# Patient Record
Sex: MALE | Race: BLACK OR AFRICAN AMERICAN | NOT HISPANIC OR LATINO | Employment: OTHER | URBAN - METROPOLITAN AREA
[De-identification: names, ages, dates, MRNs, and addresses within clinical notes are randomized per-mention and may not be internally consistent; named-entity substitution may affect disease eponyms.]

---

## 2017-03-20 ENCOUNTER — APPOINTMENT (OUTPATIENT)
Dept: RADIOLOGY | Facility: MEDICAL CENTER | Age: 67
End: 2017-03-20
Attending: EMERGENCY MEDICINE
Payer: MEDICARE

## 2017-03-20 ENCOUNTER — HOSPITAL ENCOUNTER (OUTPATIENT)
Facility: MEDICAL CENTER | Age: 67
End: 2017-03-24
Attending: EMERGENCY MEDICINE | Admitting: HOSPITALIST
Payer: MEDICARE

## 2017-03-20 ENCOUNTER — RESOLUTE PROFESSIONAL BILLING HOSPITAL PROF FEE (OUTPATIENT)
Dept: HOSPITALIST | Facility: MEDICAL CENTER | Age: 67
End: 2017-03-20
Payer: MEDICARE

## 2017-03-20 DIAGNOSIS — R00.2 PALPITATIONS: ICD-10-CM

## 2017-03-20 DIAGNOSIS — R07.81 PLEURITIC CHEST PAIN: ICD-10-CM

## 2017-03-20 PROBLEM — R06.09 DOE (DYSPNEA ON EXERTION): Status: ACTIVE | Noted: 2017-03-20

## 2017-03-20 PROBLEM — R55 NEAR SYNCOPE: Status: ACTIVE | Noted: 2017-03-20

## 2017-03-20 PROBLEM — N17.9 AKI (ACUTE KIDNEY INJURY) (HCC): Status: ACTIVE | Noted: 2017-03-20

## 2017-03-20 PROBLEM — R14.0 ABDOMINAL DISTENSION (GASEOUS): Status: ACTIVE | Noted: 2017-03-20

## 2017-03-20 LAB
ALBUMIN SERPL BCP-MCNC: 3.9 G/DL (ref 3.2–4.9)
ALBUMIN/GLOB SERPL: 1.3 G/DL
ALP SERPL-CCNC: 51 U/L (ref 30–99)
ALT SERPL-CCNC: 11 U/L (ref 2–50)
ANION GAP SERPL CALC-SCNC: 6 MMOL/L (ref 0–11.9)
APTT PPP: 26.2 SEC (ref 24.7–36)
AST SERPL-CCNC: 18 U/L (ref 12–45)
BASOPHILS # BLD AUTO: 0.7 % (ref 0–1.8)
BASOPHILS # BLD: 0.03 K/UL (ref 0–0.12)
BILIRUB SERPL-MCNC: 0.4 MG/DL (ref 0.1–1.5)
BNP SERPL-MCNC: 95 PG/ML (ref 0–100)
BUN SERPL-MCNC: 15 MG/DL (ref 8–22)
CALCIUM SERPL-MCNC: 9.2 MG/DL (ref 8.5–10.5)
CHLORIDE SERPL-SCNC: 105 MMOL/L (ref 96–112)
CO2 SERPL-SCNC: 25 MMOL/L (ref 20–33)
CORTIS SERPL-MCNC: 8.7 UG/DL (ref 0–23)
CREAT SERPL-MCNC: 1.58 MG/DL (ref 0.5–1.4)
EKG IMPRESSION: NORMAL
EOSINOPHIL # BLD AUTO: 0.2 K/UL (ref 0–0.51)
EOSINOPHIL NFR BLD: 4.4 % (ref 0–6.9)
ERYTHROCYTE [DISTWIDTH] IN BLOOD BY AUTOMATED COUNT: 53.1 FL (ref 35.9–50)
GFR SERPL CREATININE-BSD FRML MDRD: 44 ML/MIN/1.73 M 2
GLOBULIN SER CALC-MCNC: 3 G/DL (ref 1.9–3.5)
GLUCOSE SERPL-MCNC: 94 MG/DL (ref 65–99)
HCT VFR BLD AUTO: 36.3 % (ref 42–52)
HGB BLD-MCNC: 11.5 G/DL (ref 14–18)
IMM GRANULOCYTES # BLD AUTO: 0.03 K/UL (ref 0–0.11)
IMM GRANULOCYTES NFR BLD AUTO: 0.7 % (ref 0–0.9)
INR PPP: 0.95 (ref 0.87–1.13)
LIPASE SERPL-CCNC: 59 U/L (ref 11–82)
LYMPHOCYTES # BLD AUTO: 1.47 K/UL (ref 1–4.8)
LYMPHOCYTES NFR BLD: 32.1 % (ref 22–41)
MCH RBC QN AUTO: 26.9 PG (ref 27–33)
MCHC RBC AUTO-ENTMCNC: 31.7 G/DL (ref 33.7–35.3)
MCV RBC AUTO: 84.8 FL (ref 81.4–97.8)
MONOCYTES # BLD AUTO: 1.18 K/UL (ref 0–0.85)
MONOCYTES NFR BLD AUTO: 25.8 % (ref 0–13.4)
NEUTROPHILS # BLD AUTO: 1.67 K/UL (ref 1.82–7.42)
NEUTROPHILS NFR BLD: 36.3 % (ref 44–72)
NRBC # BLD AUTO: 0 K/UL
NRBC BLD AUTO-RTO: 0 /100 WBC
PLATELET # BLD AUTO: 216 K/UL (ref 164–446)
PMV BLD AUTO: 10.2 FL (ref 9–12.9)
POTASSIUM SERPL-SCNC: 4.1 MMOL/L (ref 3.6–5.5)
PROT SERPL-MCNC: 6.9 G/DL (ref 6–8.2)
PROTHROMBIN TIME: 13 SEC (ref 12–14.6)
RBC # BLD AUTO: 4.28 M/UL (ref 4.7–6.1)
SODIUM SERPL-SCNC: 136 MMOL/L (ref 135–145)
TROPONIN I SERPL-MCNC: 0.02 NG/ML (ref 0–0.04)
TROPONIN I SERPL-MCNC: 0.03 NG/ML (ref 0–0.04)
WBC # BLD AUTO: 4.6 K/UL (ref 4.8–10.8)

## 2017-03-20 PROCEDURE — 80053 COMPREHEN METABOLIC PANEL: CPT

## 2017-03-20 PROCEDURE — A9270 NON-COVERED ITEM OR SERVICE: HCPCS | Performed by: HOSPITALIST

## 2017-03-20 PROCEDURE — 93306 TTE W/DOPPLER COMPLETE: CPT

## 2017-03-20 PROCEDURE — 85730 THROMBOPLASTIN TIME PARTIAL: CPT

## 2017-03-20 PROCEDURE — 700105 HCHG RX REV CODE 258: Performed by: HOSPITALIST

## 2017-03-20 PROCEDURE — 83880 ASSAY OF NATRIURETIC PEPTIDE: CPT

## 2017-03-20 PROCEDURE — 71010 DX-CHEST-PORTABLE (1 VIEW): CPT

## 2017-03-20 PROCEDURE — 36415 COLL VENOUS BLD VENIPUNCTURE: CPT

## 2017-03-20 PROCEDURE — 83690 ASSAY OF LIPASE: CPT

## 2017-03-20 PROCEDURE — 99285 EMERGENCY DEPT VISIT HI MDM: CPT

## 2017-03-20 PROCEDURE — 93005 ELECTROCARDIOGRAM TRACING: CPT | Performed by: EMERGENCY MEDICINE

## 2017-03-20 PROCEDURE — 99220 PR INITIAL OBSERVATION CARE,LEVL III: CPT | Performed by: HOSPITALIST

## 2017-03-20 PROCEDURE — 700102 HCHG RX REV CODE 250 W/ 637 OVERRIDE(OP): Performed by: HOSPITALIST

## 2017-03-20 PROCEDURE — 96360 HYDRATION IV INFUSION INIT: CPT

## 2017-03-20 PROCEDURE — 82533 TOTAL CORTISOL: CPT

## 2017-03-20 PROCEDURE — 96361 HYDRATE IV INFUSION ADD-ON: CPT

## 2017-03-20 PROCEDURE — 84484 ASSAY OF TROPONIN QUANT: CPT

## 2017-03-20 PROCEDURE — 93306 TTE W/DOPPLER COMPLETE: CPT | Mod: 26 | Performed by: INTERNAL MEDICINE

## 2017-03-20 PROCEDURE — 700111 HCHG RX REV CODE 636 W/ 250 OVERRIDE (IP): Performed by: HOSPITALIST

## 2017-03-20 PROCEDURE — 85025 COMPLETE CBC W/AUTO DIFF WBC: CPT

## 2017-03-20 PROCEDURE — G0378 HOSPITAL OBSERVATION PER HR: HCPCS

## 2017-03-20 PROCEDURE — 85610 PROTHROMBIN TIME: CPT

## 2017-03-20 RX ORDER — POLYETHYLENE GLYCOL 3350 17 G/17G
1 POWDER, FOR SOLUTION ORAL DAILY
Status: DISCONTINUED | OUTPATIENT
Start: 2017-03-20 | End: 2017-03-24 | Stop reason: HOSPADM

## 2017-03-20 RX ORDER — ONDANSETRON 4 MG/1
4 TABLET, ORALLY DISINTEGRATING ORAL EVERY 4 HOURS PRN
Status: DISCONTINUED | OUTPATIENT
Start: 2017-03-20 | End: 2017-03-24 | Stop reason: HOSPADM

## 2017-03-20 RX ORDER — SODIUM CHLORIDE 9 MG/ML
INJECTION, SOLUTION INTRAVENOUS CONTINUOUS
Status: DISCONTINUED | OUTPATIENT
Start: 2017-03-20 | End: 2017-03-21

## 2017-03-20 RX ORDER — AMOXICILLIN 250 MG
2 CAPSULE ORAL 2 TIMES DAILY
Status: DISCONTINUED | OUTPATIENT
Start: 2017-03-20 | End: 2017-03-24 | Stop reason: HOSPADM

## 2017-03-20 RX ORDER — ONDANSETRON 2 MG/ML
4 INJECTION INTRAMUSCULAR; INTRAVENOUS EVERY 4 HOURS PRN
Status: DISCONTINUED | OUTPATIENT
Start: 2017-03-20 | End: 2017-03-24 | Stop reason: HOSPADM

## 2017-03-20 RX ORDER — HEPARIN SODIUM 5000 [USP'U]/ML
5000 INJECTION, SOLUTION INTRAVENOUS; SUBCUTANEOUS EVERY 8 HOURS
Status: DISCONTINUED | OUTPATIENT
Start: 2017-03-20 | End: 2017-03-24 | Stop reason: HOSPADM

## 2017-03-20 RX ORDER — POLYETHYLENE GLYCOL 3350 17 G/17G
1 POWDER, FOR SOLUTION ORAL
Status: DISCONTINUED | OUTPATIENT
Start: 2017-03-20 | End: 2017-03-24 | Stop reason: HOSPADM

## 2017-03-20 RX ORDER — BISACODYL 10 MG
10 SUPPOSITORY, RECTAL RECTAL
Status: DISCONTINUED | OUTPATIENT
Start: 2017-03-20 | End: 2017-03-24 | Stop reason: HOSPADM

## 2017-03-20 RX ADMIN — STANDARDIZED SENNA CONCENTRATE AND DOCUSATE SODIUM 2 TABLET: 8.6; 5 TABLET, FILM COATED ORAL at 20:56

## 2017-03-20 RX ADMIN — HEPARIN SODIUM 5000 UNITS: 5000 INJECTION, SOLUTION INTRAVENOUS; SUBCUTANEOUS at 20:56

## 2017-03-20 RX ADMIN — SODIUM CHLORIDE: 9 INJECTION, SOLUTION INTRAVENOUS at 16:40

## 2017-03-20 ASSESSMENT — LIFESTYLE VARIABLES: DO YOU DRINK ALCOHOL: NO

## 2017-03-20 ASSESSMENT — PAIN SCALES - GENERAL
PAINLEVEL_OUTOF10: 0
PAINLEVEL_OUTOF10: 0

## 2017-03-20 NOTE — ED PROVIDER NOTES
ED Provider Note    CHIEF COMPLAINT  Chief Complaint   Patient presents with   • Chest Pain     Chest tightness and SOB with sexual activity and walking since Jan. Had a severe episode today while walking to [facility] [employer] and went to [facility] and was found to have a L BBB and ST elevation in anterior leads.    • Shortness of Breath     SOB with chest tightness.        HPI  Seth Olivera is a 66 y.o. male who presents with episode of heart racing, around 8 AM this morning, lasted several seconds, less than 30 seconds. When this episode happened he did have some fullness in his chest and shortness of breath but after he took a few deep breaths the palpitations resolved. No chest pain no nausea no vomiting no diaphoresis no radiation. This episode today lasted somewhat less than 30 seconds, has had similar episodes multiple times in the last 3 months. No workup.  Evidently he was seen by paramedics and given him aspirin  REVIEW OF SYSTEMS  See HPI for further details. Denies other G.I., G.U.. endrocine, cardiovascular, respriatory or neurological problems. All other systems are negative.     PAST MEDICAL HISTORY  Past Medical History   Diagnosis Date   • High cholesterol        FAMILY HISTORY negative for heart disease  History reviewed. No pertinent family history.    SOCIAL HISTORY he tells me he is a nonsmoker  Social History     Social History   • Marital Status: Single     Spouse Name: N/A   • Number of Children: N/A   • Years of Education: N/A     Social History Main Topics   • Smoking status: Current Some Day Smoker     Types: Cigarettes   • Smokeless tobacco: Never Used   • Alcohol Use: Yes      Comment: heavy 3 days per month   • Drug Use: No   • Sexual Activity: Not Asked     Other Topics Concern   • None     Social History Narrative       SURGICAL HISTORY  Past Surgical History   Procedure Laterality Date   • Other orthopedic surgery       hardware in back       CURRENT MEDICATIONS  Home Medications      "Reviewed by Jennifer Harley R.N. (Registered Nurse) on 03/20/17 at 1012  Med List Status: Complete    Medication Last Dose Status          Patient Jonh Taking any Medications                        ALLERGIES  No Known Allergies    PHYSICAL EXAM  VITAL SIGNS: /98 mmHg  Pulse 85  Temp(Src) 36.8 °C (98.2 °F)  Resp 28  Ht 1.88 m (6' 2\")  Wt 78.019 kg (172 lb)  BMI 22.07 kg/m2  SpO2 97%  Constitutional: Well developed, Well nourished, No acute distress, Non-toxic appearance.   HENT: Normocephalic, Atraumatic, Bilateral external ears normal, Oropharynx moist, No oral exudates, Nose normal.   Eyes: PERRL, EOMI, Conjunctiva normal, No discharge.   Neck: Normal range of motion, No tenderness, Supple, No stridor.   Lymphatic: No lymphadenopathy noted.   Cardiovascular: Normal heart rate, Normal rhythm, No murmurs, No rubs, No gallops.   Thorax & Lungs: Normal breath sounds, No respiratory distress, No wheezing, No chest tenderness.   Abdomen:  No tenderness, no guarding no rigidity and the abdomen is soft.  No masses, No pulsatile masses.  Skin: Warm, Dry, No erythema, No rash.   Back: No tenderness, No CVA tenderness.   Extremities: Intact distal pulses, No edema, No tenderness, No cyanosis, No clubbing.   Musculoskeletal: Good range of motion in all major joints. No tenderness to palpation or major deformities noted.   Neurologic: Alert & oriented x 3, Normal motor function, Normal sensory function, No focal deficits noted.   Psychiatric: Affect normal, Judgment normal, Mood normal.   EKG Interpretation    Interpreted by me    Rhythm: normal sinus   Rate: normal  Axis: normal  Ectopy: none  Conduction: Left bundle branch block     ST Segments: no acute change  T Waves: no acute change  Q Waves: none  Described to you for left ventricular hypertrophy  Clinical Impression: I do not have an old EKG to compare to      RADIOLOGY/PROCEDURES  DX-CHEST-PORTABLE (1 VIEW)   Final Result      No acute cardiopulmonary " disease.            COURSE & MEDICAL DECISION MAKING  Pertinent Labs & Imaging studies reviewed. (See chart for details) BNP is normal troponin and normal electrolytes, renal function creatinine slightly elevated. White count, 4.6 hematocrit 36 platelet count normal    He has had multiple episodes of heart beating fast, over the last 3 months, never last more than a few seconds. He does have some strange feeling in his chest when this happens and slight shortness of breath but it resolves quickly. Has been given aspirin this morning  Think this is more transient arrhythmia and chest pain. We'll talk with cardiology about possible monitoring or other plans.  Dr. Price from cardiology will see this patient shortly    Final impression:     1.   1. Palpitations        2.   3.     Disposition  Final disposition will be per Dr. Price  Electronically signed by: Zackary Dupree, 3/20/2017 10:13 AM

## 2017-03-20 NOTE — IP AVS SNAPSHOT
" <p align=\"LEFT\"><IMG SRC=\"//EMRWB/blob$/Images/[email or web address]\" alt=\"Image\" WIDTH=\"50%\" HEIGHT=\"200\" BORDER=\"\"></p>                   Name:Seth Olivera  Medical Record Number:[id number]  CSN: [id number]    YOB: 1950   Age: 66 y.o.  Sex: male  HT:1.88 m (6' 2\") WT: 76.8 kg (169 lb 5 oz)          Admit Date: 3/20/2017     Discharge Date:   Today's Date: 3/24/2017  Attending Doctor:  Chrissy Prescott M.D.                  Allergies:  Review of patient's allergies indicates no known allergies.          Your appointments     Mar 30, 2017  7:45 AM   Heart Failure New with Al Mcgarry M.D.   [facility] (--)    [address], [address]  [place] [address] [phone]            May 01, 2017  9:20 AM   New Patient with JUAN Zambrano   [facility] [address] ([facility])    [address]  [place] [address] [phone]           Please bring Photo ID, Insurance Cards, All Medication Bottles and copies of any legal documents (such as Living Will, Power of ) If speaking a language besides English please bring an adult . Please arrive 30 minutes prior for check in and registration. You will be receiving a confirmation call a few days before your appointment from our automated call confirmation system.              Follow-up Information     1. Follow up with Al Mcgarry M.D. In 2 weeks.    Specialty:  Cardiology    Contact information    [address]  [address]  [place] [address] [phone]          2. Follow up with JUAN Zambrano In 1 week.    Specialty:  Family Medicine    Contact information    [address] [facility] [address]  [place] [address] [phone]           Medication List      Take these Medications        Instructions    aspirin 81 MG Chew chewable tablet   Commonly known as:  ASA    Take 1 Tab by mouth every day.   Dose:  81 mg       atorvastatin 40 MG Tabs   Commonly known as:  LIPITOR    Take 1 Tab by " mouth every bedtime.   Dose:  40 mg       carvedilol 12.5 MG Tabs   Commonly known as:  COREG    Take 1 Tab by mouth 2 times a day, with meals.   Dose:  12.5 mg       ferrous gluconate 324 (38 FE) MG Tabs   Commonly known as:  FERGON    Take 1 Tab by mouth every morning with breakfast.   Dose:  324 mg       fluticasone 50 MCG/ACT nasal spray   Commonly known as:  FLONASE    Spray 2 Sprays in nose every day.   Dose:  2 Spray       lisinopril 5 MG Tabs   Commonly known as:  PRINIVIL    Take 1 Tab by mouth every day.   Dose:  5 mg       multivitamin Tabs    Take 1 Tab by mouth every day.   Dose:  1 Tab       oseltamivir 30 MG Caps   Commonly known as:  TAMIFLU    Take 1 Cap by mouth every 12 hours for 3 days.   Dose:  30 mg       spironolactone 25 MG Tabs   Commonly known as:  ALDACTONE    Take 0.5 Tabs by mouth every day.   Dose:  12.5 mg       VITAMIN D PO    Take 3 Caps by mouth every 7 days.   Dose:  3 Cap

## 2017-03-20 NOTE — ED NOTES
[facility] with c/o   Chief Complaint   Patient presents with   • Chest Pain     Chest tightness and SOB with sexual activity and walking since Jan. Had a severe episode today while walking [facility] [employer] and went to [facility] and was found to have a L BBB and ST elevation in anterior leads.    • Shortness of Breath     SOB with chest tightness.    Received 324 mg ASA PTA. Denies current CP or tightness. Blood drawn and sent to lab. EKG complete and given to ERP. ERP at bedside.

## 2017-03-20 NOTE — IP AVS SNAPSHOT
" Home Care Instructions                                                                                                                  Name:Seth Olivera  Medical Record Number:[id number]  CSN: [id number]    YOB: 1950   Age: 66 y.o.  Sex: male  HT:1.88 m (6' 2\") WT: 76.8 kg (169 lb 5 oz)          Admit Date: 3/20/2017     Discharge Date:   Today's Date: 3/24/2017  Attending Doctor:  Chrissy Prescott M.D.                  Allergies:  Review of patient's allergies indicates no known allergies.            Discharge Instructions       Cardiac Event Monitoring  A cardiac event monitor is a small recording device used to help detect abnormal heart rhythms (arrhythmias). The monitor is used to record heart rhythm when noticeable symptoms such as the following occur:  · Fast heartbeats (palpitations), such as heart racing or fluttering.  · Dizziness.  · Fainting or light-headedness.  · Unexplained weakness.  The monitor is wired to two electrodes placed on your chest. Electrodes are flat, sticky disks that attach to your skin. The monitor can be worn for up to 30 days. You will wear the monitor at all times, except when bathing.   HOW TO USE YOUR CARDIAC EVENT MONITOR  A technician will prepare your chest for the electrode placement. The technician will show you how to place the electrodes, how to work the monitor, and how to replace the batteries. Take time to practice using the monitor before you leave the office. Make sure you understand how to send the information from the monitor to your health care provider. This requires a telephone with a landline, not a cell phone. You need to:  · Wear your monitor at all times, except when you are in water:  · Do not get the monitor wet.  · Take the monitor off when bathing. Do not swim or use a hot tub with it on.  · Keep your skin clean. Do not put body lotion or moisturizer on your chest.  · Change the electrodes daily or any time they stop sticking to your skin. You " might need to use tape to keep them on.  · It is possible that your skin under the electrodes could become irritated. To keep this from happening, try to put the electrodes in slightly different places on your chest. However, they must remain in the area under your left breast and in the upper right section of your chest.  · Make sure the monitor is safely clipped to your clothing or in a location close to your body that your health care provider recommends.  · Press the button to record when you feel symptoms of heart trouble, such as dizziness, weakness, light-headedness, palpitations, thumping, shortness of breath, unexplained weakness, or a fluttering or racing heart. The monitor is always on and records what happened slightly before you pressed the button, so do not worry about being too late to get good information.  · Keep a diary of your activities, such as walking, doing chores, and taking medicine. It is especially important to note what you were doing when you pushed the button to record your symptoms. This will help your health care provider determine what might be contributing to your symptoms. The information stored in your monitor will be reviewed by your health care provider alongside your diary entries.  · Send the recorded information as recommended by your health care provider. It is important to understand that it will take some time for your health care provider to process the results.  · Change the batteries as recommended by your health care provider.  SEEK IMMEDIATE MEDICAL CARE IF:   · You have chest pain.  · You have extreme difficulty breathing or shortness of breath.  · You develop a very fast heartbeat that persists.  · You develop dizziness that does not go away.  · You faint or constantly feel you are about to faint.     This information is not intended to replace advice given to you by your health care provider. Make sure you discuss any questions you have with your health care  provider.     Document Released: 09/26/2009 Document Revised: 01/08/2016 Document Reviewed: 06/16/2014  [employer] Patient Education ©2016 [employer]    Cardiac Arrhythmia  Your heart is a muscle that works to pump blood through your body by regular contractions. The beating of your heart is controlled by a system of special pacemaker cells. These cells control the electrical activity of the heart. When the system controlling this regular beating is disturbed, a heart rhythm abnormality (arrhythmia) results.  WHEN YOUR HEART SKIPS A BEAT  One of the most common and least serious heart arrhythmias is called an ectopic or premature atrial heartbeat (PAC). This may be noticed as a small change in your regular pulse. A PAC originates from the top part (atrium) of the heart. Within the right atrium, the SA node is the area that normally controls the regularity of the heart. PACs occur in heart tissue outside of the SA node region. You may feel this as a skipped beat or heart flutter, especially if several occur in succession or occur frequently.   Another arrhythmia is ventricular premature complex (VCP or PVC). These extra beats start out in the bottom, more muscular chambers of the heart. In most cases a PVC is harmless. If there are underlying causes that are making the heart irritable such as an overactive thyroid or a prior heart attack PVCs may be of more concern. In a few cases, medications to control the heart rhythm may be prescribed.  Things to try at home:  · Cut down or avoid alcohol, tobacco and caffeine.  · Get enough sleep.  · Reduce stress.  · Exercise more.  WHEN THE HEART BEATS TOO FAST  Atrial tachycardia is a fast heart rate, which starts out in the atrium. It may last from minutes to much longer. Your heart may beat 140 to 240 times per minute instead of the normal 60 to 100.  · Symptoms include a worried feeling (anxiety) and a sense that your heart is beating fast and hard.  · You may  be able to stop the fast rate by holding your breath or bearing down as if you were going to have a bowel movement.  · This type of fast rate is usually not dangerous.  Atrial fibrillation and atrial flutter are other fast rhythms that start in the atria. Both conditions keep the atria from filling with enough blood so the heart does not work well.  · Symptoms include feeling light-headed or faint.  · These fast rates may be the result of heart damage or disease. Too much thyroid hormone may play a role.  · There may be no clear cause or it may be from heart disease or damage.  · Medication or a special electrical treatment (cardioversion) may be needed to get the heart beating normally.  Ventricular tachycardia is a fast heart rate that starts in the lower muscular chambers (ventricles) This is a serious disorder that requires treatment as soon as possible. You need someone else to get and use a small defibrillator.  · Symptoms include collapse, chest pain, or being short of breath.  · Treatment may include medication, procedures to improve blood flow to the heart, or an implantable cardiac defibrillator (ICD).  DIAGNOSIS   · A cardiogram (EKG or ECG) will be done to see the arrhythmia, as well as lab tests to check the underlying cause.  · If the extra beats or fast rate come and go, you may wear a Holter monitor that records your heart rate for a longer period of time.  SEEK MEDICAL CARE IF:  · You have irregular or fast heartbeats (palpitations).  · You experience skipped beats.  · You develop lightheadedness.  · You have chest discomfort.  · You have shortness of breath.  · You have more frequent episodes, if you are already being treated.  SEEK IMMEDIATE MEDICAL CARE IF:   · You have severe chest pain, especially if the pain is crushing or pressure-like and spreads to the arms, back, neck, or jaw, or if you have sweating, feeling sick to your stomach (nausea), or shortness of breath. THIS IS AN EMERGENCY. Do  not wait to see if the pain will go away. Get medical help at once. Call 911 or 0 (). DO NOT drive yourself to the hospital.  · You feel dizzy or faint.  · You have episodes of previously documented atrial tachycardia that do not resolve with the techniques your caregiver has taught you.  · Irregular or rapid heartbeats begin to occur more often than in the past, especially if they are associated with more pronounced symptoms or of longer duration.  Document Released: 12/18/2006 Document Revised: 03/11/2013 Document Reviewed: 08/05/2009  [employer]® Patient Information ©2014 [employer].  Return if fever, vomiting or if no better in 12 hours.Return if fever, vomiting or if no better in 12 hours.    Your appointments     Mar 30, 2017  7:45 AM   Heart Failure New with Al Mcgarry M.D.   [facility] (--)    [address], [address]  [place] [id number] [phone]            May 01, 2017  9:20 AM   New Patient with AJAY Zambrano.   [facility] [address]    [address]  [address]  [place] [id number] [phone]           Please bring Photo ID, Insurance Cards, All Medication Bottles and copies of any legal documents (such as Living Will, Power of ) If speaking a language besides English please bring an adult . Please arrive 30 minutes prior for check in and registration. You will be receiving a confirmation call a few days before your appointment from our automated call confirmation system.              Follow-up Information     1. Follow up with Al Mcgarry M.D. In 2 weeks.    Specialty:  Cardiology    Contact information    [address]  [address]  [place] [id number] [phone]          2. Follow up with JUAN Zambrano In 1 week.    Specialty:  Family Medicine    Contact information    [address]  [address]  [place] [id number] [phone]           Discharge Medication  Instructions:    Below are the medications your physician expects you to take upon discharge:    Review all your home medications and newly ordered medications with your doctor and/or pharmacist. Follow medication instructions as directed by your doctor and/or pharmacist.    Please keep your medication list with you and share with your physician.               Medication List      START taking these medications        Instructions    aspirin 81 MG Chew chewable tablet   Last time this was given:  81 mg on 3/24/2017  9:24 AM   Commonly known as:  ASA   Next Dose Due:  3/25 9am    Take 1 Tab by mouth every day.   Dose:  81 mg       atorvastatin 40 MG Tabs   Last time this was given:  40 mg on 3/23/2017  8:07 PM   Commonly known as:  LIPITOR   Next Dose Due:  3/24 9pm    Take 1 Tab by mouth every bedtime.   Dose:  40 mg       carvedilol 12.5 MG Tabs   Last time this was given:  12.5 mg on 3/24/2017  9:25 AM   Commonly known as:  COREG   Next Dose Due:  3/24 6pm with dinner    Take 1 Tab by mouth 2 times a day, with meals.   Dose:  12.5 mg       ferrous gluconate 324 (38 FE) MG Tabs   Last time this was given:  324 mg on 3/24/2017  9:25 AM   Commonly known as:  FERGON   Next Dose Due:  3/25 9am    Take 1 Tab by mouth every morning with breakfast.   Dose:  324 mg       fluticasone 50 MCG/ACT nasal spray   Commonly known as:  FLONASE   Next Dose Due:  3/25 9am    Spray 2 Sprays in nose every day.   Dose:  2 Spray       lisinopril 5 MG Tabs   Last time this was given:  2.5 mg on 3/24/2017  9:24 AM   Commonly known as:  PRINIVIL   Next Dose Due:  3/25 9am    Take 1 Tab by mouth every day.   Dose:  5 mg       multivitamin Tabs   Last time this was given:  1 Tab on 3/24/2017  9:25 AM   Next Dose Due:  3/25 9am    Take 1 Tab by mouth every day.   Dose:  1 Tab       oseltamivir 30 MG Caps   Last time this was given:  30 mg on 3/24/2017  9:24 AM   Commonly known as:  TAMIFLU   Next Dose Due:  3/24 9pm    Take 1 Cap by mouth  every 12 hours for 3 days.   Dose:  30 mg       spironolactone 25 MG Tabs   Last time this was given:  12.5 mg on 3/24/2017  9:24 AM   Commonly known as:  ALDACTONE   Next Dose Due:  3/25 9am    Take 0.5 Tabs by mouth every day.   Dose:  12.5 mg         CONTINUE taking these medications        Instructions    VITAMIN D PO   Next Dose Due:  Resume prior schedule    Take 3 Caps by mouth every 7 days.   Dose:  3 Cap               Instructions           Diet / Nutrition:    Follow any diet instructions given to you by your doctor or the dietician, including how much salt (sodium) you are allowed each day.    If you are overweight, talk to your doctor about a weight reduction plan.    Activity:    Remain physically active following your doctor's instructions about exercise and activity.    Rest often.     Any time you become even a little tired or short of breath, SIT DOWN and rest.    Worsening Symptoms:    Report any of the following signs and symptoms to the doctor's office immediately:    *Pain of jaw, arm, or neck  *Chest pain not relieved by medication                               *Dizziness or loss of consciousness  *Difficulty breathing even when at rest   *More tired than usual                                       *Bleeding drainage or swelling of surgical site  *Swelling of feet, ankles, legs or stomach                 *Fever (>100ºF)  *Pink or blood tinged sputum  *Weight gain (3lbs/day or 5lbs /week)           *Shock from internal defibrillator (if applicable)  *Palpitations or irregular heartbeats                *Cool and/or numb extremities    Stroke Awareness    Common Risk Factors for Stroke include:    Age  Atrial Fibrillation  Carotid Artery Stenosis  Diabetes Mellitus  Excessive alcohol consumption  High blood pressure  Overweight   Physical inactivity  Smoking    Warning signs and symptoms of a stroke include:    *Sudden numbness or weakness of the face, arm or leg (especially on one side of the  body).  *Sudden confusion, trouble speaking or understanding.  *Sudden trouble seeing in one or both eyes.  *Sudden trouble walking, dizziness, loss of balance or coordination.Sudden severe headache with no known cause.    It is very important to get treatment quickly when a stroke occurs. If you experience any of the above warning signs, call 911 immediately.                   Disclaimer         Quit Smoking / Tobacco Use:    I understand the use of any tobacco products increases my chance of suffering from future heart disease or stroke and could cause other illnesses which may shorten my life. Quitting the use of tobacco products is the single most important thing I can do to improve my health. For further information on smoking / tobacco cessation call a Toll Free Quit Line at 1-660.817.1548 (*[employer]) or 1-918.701.1348 (American Lung Association) or you can access the web based program at www.lungusa.org.    [place] Tobacco Users Help Line:  (975) 588-9588       Toll Free: 1-596.238.3411  Quit Tobacco Program [facility] (963)752-5446    Crisis Hotline:    [employer] Crisis Hotline:  9-987-RHNLVYF or 1-493.325.3082    [place] Crisis Hotline:    1-798.461.5175 or 436-185-6149    Discharge Survey:   Thank you for choosing [facility]. We hope we did everything we could to make your hospital stay a pleasant one. You may be receiving a phone survey and we would appreciate your time and participation in answering the questions. Your input is very valuable to us in our efforts to improve our service to our patients and their families.        My signature on this form indicates that:    1. I have reviewed and understand the above information.  2. My questions regarding this information have been answered to my satisfaction.  3. I have formulated a plan with my discharge nurse to obtain my prescribed medications for home.                  Disclaimer          __________________________________                     __________       ________                       Patient Signature                                                 Date                    Time

## 2017-03-20 NOTE — CONSULTS
Cardiology consultation     Patient ID:  Seth Olivera  [id number]  66 y.o. male  1950    Reason for consultation: Chest pain and palpitations   asking for consultation: Dr. Shreyas Pereyra    HPI:  Very pleasant 66-year-old -American gentleman originally from [place]. Moved here recently from [place]. Has a chronic history of back pain. He is currently short of money and homeless.  He has been trying to follow up with the [facility] clinic in regards to his hypercholesterolemia and usual health maintenance.    He has not been ill, but states that he has sexual intercourse almost every day. What has been scaring him is that he's been having tightness in his heart and breathlessness and feelings of panic and racing heart every single time he engages in intercourse. He is still doing it but thinks he might actually die.    These feelings do not happen when he walks up stairs or goes down the street. He doesn't have associated pain in his back nor does he had fevers or loss of consciousness or bleeding.    He has never had an EKG before and does not know what a left bundle branch block is  He currently feels well and has had no symptoms today. He states he felt his heart racing and lasted for 30 seconds today when he was going to the [place]. He came here for this reason    Past Medical History:  has a past medical history of High cholesterol.  Past Surgical History:  has past surgical history that includes other orthopedic surgery.  Past Social History:  reports that he has been smoking Cigarettes.  He has never used smokeless tobacco. He reports that he drinks alcohol. He reports that he does not use illicit drugs.  Past Family History: History reviewed. No pertinent family history.  Allergies: Review of patient's allergies indicates no known allergies.    Current Medications:  Prior to Admission medications    Medication Sig Start Date End Date Taking? Authorizing Provider   Cholecalciferol (VITAMIN D PO) Take 3  "Caps by mouth every 7 days.   Yes Not In System Provider       Review of Systems:  Review of Systems   Constitutional: Negative for weight loss.   Respiratory: Positive for shortness of breath (with sexual intercourse, correlates of tightness and overwhelming feeling of anxiety). Negative for cough.    Cardiovascular: Positive for chest pain and palpitations. Negative for orthopnea, leg swelling and PND.   Gastrointestinal: Negative for heartburn.   Genitourinary: Negative.    Musculoskeletal: Positive for back pain (chronic, has trouble walking). Negative for myalgias and falls.   Neurological: Positive for dizziness. Negative for seizures and loss of consciousness.   Endo/Heme/Allergies: Does not bruise/bleed easily.   Psychiatric/Behavioral: Negative for depression and memory loss. The patient is nervous/anxious and has insomnia.    All other systems reviewed and are negative.    Blood pressure 142/98, pulse 94, temperature 36.8 °C (98.2 °F), resp. rate 23, height 1.88 m (6' 2\"), weight 78.019 kg (172 lb), SpO2 99 %.    Physical Examination:  Physical Exam   Constitutional: He is oriented to person, place, and time. He appears well-developed. No distress.   Good dentition, well kempt, healthy-appearing   HENT:   Mouth/Throat: Mucous membranes are normal.   Eyes: Conjunctivae and EOM are normal.   Neck: No JVD present. No tracheal deviation present. No thyroid mass and no thyromegaly present.   Cardiovascular: Normal rate, regular rhythm and intact distal pulses.    No murmur heard.  Pulmonary/Chest: Effort normal and breath sounds normal. No respiratory distress. He exhibits no tenderness.   Abdominal: Soft. There is no tenderness.   Musculoskeletal: Normal range of motion. He exhibits no edema.   Neurological: He is alert and oriented to person, place, and time. He has normal strength. He displays no tremor.   Skin: Skin is warm and dry. He is not diaphoretic.   Psychiatric: He has a normal mood and affect. His " behavior is normal.   Vitals reviewed.      Data  Total ECG reviewed by me shows sinus mechanism with a left bundle branch block  Troponin is negative as is a CMP. Chest x-rays reviewed by myself which is normal    CBC is normal    Impression:  Chest discomfort and palpitations/chest pain syndrome  Left bundle branch block    Plan:  66-year-old gentleman with a low heart attack risk score, does have a bundle branch block with a normal troponin. No high risk features for acute coronary syndrome    We'll admit and do a noninvasive workup which included an echocardiogram and a nuclear stress test. If they're high risk findings on this such as a large amount of ischemia or low ejection fraction, we'll do more invasive approach. He agrees. He currently feels well    Aspirin and blood pressure control  No indication for heparin in this low-risk setting  Follow-up as indicated  Any concerns in the interim please don't hesitate to talk with me, discussed with Dr. Pereyra at the bedside

## 2017-03-20 NOTE — DISCHARGE INSTRUCTIONS
Cardiac Event Monitoring  A cardiac event monitor is a small recording device used to help detect abnormal heart rhythms (arrhythmias). The monitor is used to record heart rhythm when noticeable symptoms such as the following occur:  · Fast heartbeats (palpitations), such as heart racing or fluttering.  · Dizziness.  · Fainting or light-headedness.  · Unexplained weakness.  The monitor is wired to two electrodes placed on your chest. Electrodes are flat, sticky disks that attach to your skin. The monitor can be worn for up to 30 days. You will wear the monitor at all times, except when bathing.   HOW TO USE YOUR CARDIAC EVENT MONITOR  A technician will prepare your chest for the electrode placement. The technician will show you how to place the electrodes, how to work the monitor, and how to replace the batteries. Take time to practice using the monitor before you leave the office. Make sure you understand how to send the information from the monitor to your health care provider. This requires a telephone with a landline, not a cell phone. You need to:  · Wear your monitor at all times, except when you are in water:  · Do not get the monitor wet.  · Take the monitor off when bathing. Do not swim or use a hot tub with it on.  · Keep your skin clean. Do not put body lotion or moisturizer on your chest.  · Change the electrodes daily or any time they stop sticking to your skin. You might need to use tape to keep them on.  · It is possible that your skin under the electrodes could become irritated. To keep this from happening, try to put the electrodes in slightly different places on your chest. However, they must remain in the area under your left breast and in the upper right section of your chest.  · Make sure the monitor is safely clipped to your clothing or in a location close to your body that your health care provider recommends.  · Press the button to record when you feel symptoms of heart trouble, such as  dizziness, weakness, light-headedness, palpitations, thumping, shortness of breath, unexplained weakness, or a fluttering or racing heart. The monitor is always on and records what happened slightly before you pressed the button, so do not worry about being too late to get good information.  · Keep a diary of your activities, such as walking, doing chores, and taking medicine. It is especially important to note what you were doing when you pushed the button to record your symptoms. This will help your health care provider determine what might be contributing to your symptoms. The information stored in your monitor will be reviewed by your health care provider alongside your diary entries.  · Send the recorded information as recommended by your health care provider. It is important to understand that it will take some time for your health care provider to process the results.  · Change the batteries as recommended by your health care provider.  SEEK IMMEDIATE MEDICAL CARE IF:   · You have chest pain.  · You have extreme difficulty breathing or shortness of breath.  · You develop a very fast heartbeat that persists.  · You develop dizziness that does not go away.  · You faint or constantly feel you are about to faint.     This information is not intended to replace advice given to you by your health care provider. Make sure you discuss any questions you have with your health care provider.     Document Released: 09/26/2009 Document Revised: 01/08/2016 Document Reviewed: 06/16/2014  [employer] Patient Education ©2016 [employer]    Cardiac Arrhythmia  Your heart is a muscle that works to pump blood through your body by regular contractions. The beating of your heart is controlled by a system of special pacemaker cells. These cells control the electrical activity of the heart. When the system controlling this regular beating is disturbed, a heart rhythm abnormality (arrhythmia) results.  WHEN YOUR HEART  SKIPS A BEAT  One of the most common and least serious heart arrhythmias is called an ectopic or premature atrial heartbeat (PAC). This may be noticed as a small change in your regular pulse. A PAC originates from the top part (atrium) of the heart. Within the right atrium, the SA node is the area that normally controls the regularity of the heart. PACs occur in heart tissue outside of the SA node region. You may feel this as a skipped beat or heart flutter, especially if several occur in succession or occur frequently.   Another arrhythmia is ventricular premature complex (VCP or PVC). These extra beats start out in the bottom, more muscular chambers of the heart. In most cases a PVC is harmless. If there are underlying causes that are making the heart irritable such as an overactive thyroid or a prior heart attack PVCs may be of more concern. In a few cases, medications to control the heart rhythm may be prescribed.  Things to try at home:  · Cut down or avoid alcohol, tobacco and caffeine.  · Get enough sleep.  · Reduce stress.  · Exercise more.  WHEN THE HEART BEATS TOO FAST  Atrial tachycardia is a fast heart rate, which starts out in the atrium. It may last from minutes to much longer. Your heart may beat 140 to 240 times per minute instead of the normal 60 to 100.  · Symptoms include a worried feeling (anxiety) and a sense that your heart is beating fast and hard.  · You may be able to stop the fast rate by holding your breath or bearing down as if you were going to have a bowel movement.  · This type of fast rate is usually not dangerous.  Atrial fibrillation and atrial flutter are other fast rhythms that start in the atria. Both conditions keep the atria from filling with enough blood so the heart does not work well.  · Symptoms include feeling light-headed or faint.  · These fast rates may be the result of heart damage or disease. Too much thyroid hormone may play a role.  · There may be no clear cause or  it may be from heart disease or damage.  · Medication or a special electrical treatment (cardioversion) may be needed to get the heart beating normally.  Ventricular tachycardia is a fast heart rate that starts in the lower muscular chambers (ventricles) This is a serious disorder that requires treatment as soon as possible. You need someone else to get and use a small defibrillator.  · Symptoms include collapse, chest pain, or being short of breath.  · Treatment may include medication, procedures to improve blood flow to the heart, or an implantable cardiac defibrillator (ICD).  DIAGNOSIS   · A cardiogram (EKG or ECG) will be done to see the arrhythmia, as well as lab tests to check the underlying cause.  · If the extra beats or fast rate come and go, you may wear a Holter monitor that records your heart rate for a longer period of time.  SEEK MEDICAL CARE IF:  · You have irregular or fast heartbeats (palpitations).  · You experience skipped beats.  · You develop lightheadedness.  · You have chest discomfort.  · You have shortness of breath.  · You have more frequent episodes, if you are already being treated.  SEEK IMMEDIATE MEDICAL CARE IF:   · You have severe chest pain, especially if the pain is crushing or pressure-like and spreads to the arms, back, neck, or jaw, or if you have sweating, feeling sick to your stomach (nausea), or shortness of breath. THIS IS AN EMERGENCY. Do not wait to see if the pain will go away. Get medical help at once. Call 911 or 0 (). DO NOT drive yourself to the hospital.  · You feel dizzy or faint.  · You have episodes of previously documented atrial tachycardia that do not resolve with the techniques your caregiver has taught you.  · Irregular or rapid heartbeats begin to occur more often than in the past, especially if they are associated with more pronounced symptoms or of longer duration.  Document Released: 12/18/2006 Document Revised: 03/11/2013 Document Reviewed:  08/05/2009  [employer]® Patient Information ©2014 [employer]  Return if fever, vomiting or if no better in 12 hours.Return if fever, vomiting or if no better in 12 hours.

## 2017-03-20 NOTE — ED PROVIDER NOTES
ED Provider Note    CHIEF COMPLAINT  Chief Complaint   Patient presents with   • Chest Pain     Chest tightness and SOB with sexual activity and walking since Jan. Had a severe episode today while walking to [place] and went to [facility] and was found to have a L BBB and ST elevation in anterior leads.    • Shortness of Breath     SOB with chest tightness.        ROSA Olivera is a 66 y.o. male whose care was signed out to me by Dr. Dupree please see Dr. Dupree's notes for details of the initial evaluation. The patient tells me that for a couple of months now he's been having episodes of rapid irregular heart rate shortness of breath and chest discomfort with exertion, typically experiencing these episodes while walking or with intercourse or other exertion. Symptoms typically resolve with rest. Over time the symptoms seemed to be worsening. Today he was walking and he had a very severe episode which she describes as a squeezing sensation in his chest with rapid irregular heart rate severe fatigue and dyspnea. The patient had to sit down to rest for a while and symptoms resolved but as soon as he started walking again symptoms were recurrent so he walked into the [facility] and was evaluated and then transferred to the emergency department for further evaluation and treatment. The patient was given aspirin prior to arrival. The patient tells me that as long as he is at rest he is symptom-free but earlier today when he walked from his ER room to the bathroom he complained to the nurse that his chest felt uncomfortable.    REVIEW OF SYSTEMS no fever or chills no hemoptysis no abdominal or back pain. All other systems negative    PAST MEDICAL HISTORY  Past Medical History   Diagnosis Date   • High cholesterol        FAMILY HISTORY  History reviewed. No pertinent family history.    SOCIAL HISTORY  Social History     Social History   • Marital Status: Single     Spouse Name: N/A   • Number of Children: N/A  "  • Years of Education: N/A     Social History Main Topics   • Smoking status: Current Some Day Smoker     Types: Cigarettes   • Smokeless tobacco: Never Used   • Alcohol Use: Yes      Comment: heavy 3 days per month   • Drug Use: No   • Sexual Activity: Not Asked     Other Topics Concern   • None     Social History Narrative       SURGICAL HISTORY  Past Surgical History   Procedure Laterality Date   • Other orthopedic surgery       hardware in back       CURRENT MEDICATIONS  Home Medications     Reviewed by Danna Samaniego (Pharmacy Tech) on 03/20/17 at 1204  Med List Status: Complete    Medication Last Dose Status    Cholecalciferol (VITAMIN D PO) 3/19/2017 Active                ALLERGIES  No Known Allergies    PHYSICAL EXAM  VITAL SIGNS: /98 mmHg  Pulse 94  Temp(Src) 36.8 °C (98.2 °F)  Resp 23  Ht 1.88 m (6' 2\")  Wt 78.019 kg (172 lb)  BMI 22.07 kg/m2  SpO2 99%   Oxygen saturation is interpreted as adequate  Constitutional: Awake and verbal and he does not appear toxic or distressed [facility]  HENT: Mucous membranes are moist and throat clear  Eyes: No erythema or discharge or jaundice  Neck: Trachea midline no JVD  Cardiovascular: Regular rate and rhythm at this time  Lungs: Clear and equal bilaterally with no apparent difficulty breathing  Abdomen/Back: Soft nontender nondistended no peritoneal findings  Skin: Warm and dry  Musculoskeletal: No leg edema or calf tenderness  Neurologic: Awake and verbally moving all extremities    Laboratory  A CBC shows a white blood count of 4.6 hemoglobin is adequate at 11.5 basic metabolic panel shows a slightly elevated creatinine at 1.58. LFTs were unremarkable lipase is normal troponin is within the normal range at 0.03 BNP is normal at 95 INR is normal 0.95    EKG interpretation  A 12-lead EKG shows sinus rhythm 87 bpm with wide complex QRS and large voltages and J-point elevation in V1 through V4 consistent with left bundle branch " block pattern. There are 2 cardiograms and the chart one from 9:07 AM and one from 12:14 PM and these are both very similar. There are also similar to a cardiogram obtained at the outpatient clinic earlier today and there are no older EKGs on file for comparison    Radiology  DX-CHEST-PORTABLE (1 VIEW)   Final Result      No acute cardiopulmonary disease.        MEDICAL DECISION MAKING and DISPOSITION  In the emergency department an IV was established and the patient was placed on a cardiac monitor and the patient had received aspirin prior to arrival. As long as he remains at rest in the [facility] he is asymptomatic. Given that the patient's symptoms are becoming more severe and are clearly related to minimal exertion I feel he is going to require hospitalization and I have reviewed the case with the hospitalist and the patient is now admitted to the hospitalist service for further evaluation and treatment    IMPRESSION  1. Exertional chest pain and palpitations  2. Renal insufficiency         Electronically signed by: Steffen Capone, 3/20/2017 2:33 PM

## 2017-03-20 NOTE — IP AVS SNAPSHOT
3/24/2017          Seth Olivera  [address] [place] [address]    Dear Seth:    [facility] wants to ensure your discharge home is safe and you or your loved ones have had all your questions answered regarding your care after you leave the hospital.    You may receive a telephone call within two days of your discharge.  This call is to make certain you understand your discharge instructions as well as ensure we provided you with the best care possible during your stay with us.     The call will only last approximately 3-5 minutes and will be done by a nurse.    Once again, we want to ensure your discharge home is safe and that you have a clear understanding of any next steps in your care.  If you have any questions or concerns, please do not hesitate to contact us, we are here for you.  Thank you for choosing [facility] for your healthcare needs.    Sincerely,    Uday Summers    [facility]

## 2017-03-20 NOTE — DISCHARGE PLANNING
Care Transition Team Assessment    Information Source  Orientation : Oriented x 4  Information Given By: Patient  Who is responsible for making decisions for patient? : Patient    Readmission Evaluation  Is this a readmission?: No    Elopement Risk  Legal Hold: No  Ambulatory or Self Mobile in Wheelchair: No-Not an Elopement Risk    Interdisciplinary Discharge Planning  Does Admitting Nurse Feel This Could be a Complex Discharge?: No  Primary Care Physician:  (pcp)  Lives with - Patient's Self Care Capacity: Alone and Able to Care For Self  Support Systems: Friends / Neighbors  Housing / Facility: 2 Story Apartment / Condo  Do You Take your Prescribed Medications Regularly: Yes  Able to Return to Previous ADL's: Yes  Mobility Issues: No  Prior Services: None  Assistance Needed: No    Discharge Preparedness  What is your plan after discharge?: Uncertain - pending medical team collaboration  Prior Functional Level: Ambulatory  Difficulity with ADLs: None  Difficulity with IADLs: None    Functional Assesment  Prior Functional Level: Ambulatory    Finances  Financial Barriers to Discharge: No  Prescription Coverage: Yes    Vision / Hearing Impairment  Vision Impairment : Yes  Hearing Impairment : No    Values / Beliefs / Concerns  Values / Beliefs Concerns : No    Advance Directive  Advance Directive?: None  Advance Directive offered?: AD Booklet refused    Domestic Abuse  Have you ever been the victim of abuse or violence?: No  Physical Abuse or Sexual Abuse: No  Verbal Abuse or Emotional Abuse: No    Psychological Assessment  History of Substance Abuse: None  History of Psychiatric Problems: No  Non-compliant with Treatment: No  Newly Diagnosed Illness: No    Discharge Risks or Barriers  Discharge risks or barriers?: No PCP    Anticipated Discharge Information  Anticipated discharge disposition: Home  Discharge Address: [facility]  Discharge Contact Phone Number: [phone]

## 2017-03-20 NOTE — IP AVS SNAPSHOT
[employer] Access Code: [email or web address]  Expires: 4/23/2017  9:54 AM    The Cur[employer] Cloud  A secure, online tool to manage your health information     [employer]’s The Currency Cloud® is a secure, online tool that connects you to your personalized health information from the privacy of your home -- day or night - making it very easy for you to manage your healthcare. Once the activation process is completed, you can even access your medical information using the [employer] Cloud sergey, which is available for free in the Apple Sergey store or Google Play store.     The Cur[employer] Cloud provides the following levels of access (as shown below):   My Chart Features   [facility] Primary Care Doctor [facility]  Specialists [facility]  Urgent  Care Non-[facility]  Primary Care  Doctor   Email your healthcare team securely and privately 24/7 X X X X   Manage appointments: schedule your next appointment; view details of past/upcoming appointments X      Request prescription refills. X      View recent personal medical records, including lab and immunizations X X X X   View health record, including health history, allergies, medications X X X X   Read reports about your outpatient visits, procedures, consult and ER notes X X X X   See your discharge summary, which is a recap of your hospital and/or ER visit that includes your diagnosis, lab results, and care plan. X X       How to register for The Cur[employer] Cloud:  1. Go to  [email or web address].  2. Click on the Sign Up Now box, which takes you to the New Member Sign Up page. You will need to provide the following information:  a. Enter your [employer] Access Code exactly as it appears at the top of this page. (You will not need to use this code after you’ve completed the sign-up process. If you do not sign up before the expiration date, you must request a new code.)   b. Enter your date of birth.   c. Enter your home email address.   d. Click Submit, and follow the next screen’s instructions.  3. Create a [employer] Cloud ID. This will be your The [employer] Cloud  login ID and cannot be changed, so think of one that is secure and easy to remember.  4. Create a [employer] password. You can change your password at any time.  5. Enter your Password Reset Question and Answer. This can be used at a later time if you forget your password.   6. Enter your e-mail address. This allows you to receive e-mail notifications when new information is available in [employer].  7. Click Sign Up. You can now view your health information.    For assistance activating your [employer] account, call [phone]

## 2017-03-21 ENCOUNTER — APPOINTMENT (OUTPATIENT)
Dept: RADIOLOGY | Facility: MEDICAL CENTER | Age: 67
End: 2017-03-21
Attending: HOSPITALIST
Payer: MEDICARE

## 2017-03-21 ENCOUNTER — APPOINTMENT (OUTPATIENT)
Dept: RADIOLOGY | Facility: MEDICAL CENTER | Age: 67
End: 2017-03-21
Attending: INTERNAL MEDICINE
Payer: MEDICARE

## 2017-03-21 PROBLEM — I42.9 CARDIOMYOPATHY (HCC): Status: ACTIVE | Noted: 2017-03-21

## 2017-03-21 PROBLEM — F10.20 ALCOHOL DEPENDENCE (HCC): Status: ACTIVE | Noted: 2017-03-21

## 2017-03-21 PROBLEM — D50.9 IRON DEFICIENCY ANEMIA: Status: ACTIVE | Noted: 2017-03-21

## 2017-03-21 LAB
ALBUMIN SERPL BCP-MCNC: 3.3 G/DL (ref 3.2–4.9)
AMPHET UR QL SCN: NEGATIVE
APPEARANCE UR: CLEAR
BARBITURATES UR QL SCN: NEGATIVE
BASOPHILS # BLD AUTO: 0.5 % (ref 0–1.8)
BASOPHILS # BLD: 0.03 K/UL (ref 0–0.12)
BENZODIAZ UR QL SCN: NEGATIVE
BILIRUB UR QL STRIP.AUTO: NEGATIVE
BNP SERPL-MCNC: 245 PG/ML (ref 0–100)
BUN SERPL-MCNC: 12 MG/DL (ref 8–22)
BZE UR QL SCN: NEGATIVE
CALCIUM SERPL-MCNC: 8.5 MG/DL (ref 8.5–10.5)
CANNABINOIDS UR QL SCN: NEGATIVE
CHLORIDE SERPL-SCNC: 108 MMOL/L (ref 96–112)
CHOLEST SERPL-MCNC: 195 MG/DL (ref 100–199)
CO2 SERPL-SCNC: 20 MMOL/L (ref 20–33)
COLOR UR: NORMAL
CREAT SERPL-MCNC: 1.31 MG/DL (ref 0.5–1.4)
CREAT UR-MCNC: 111.4 MG/DL
EOSINOPHIL # BLD AUTO: 0.26 K/UL (ref 0–0.51)
EOSINOPHIL NFR BLD: 4.5 % (ref 0–6.9)
ERYTHROCYTE [DISTWIDTH] IN BLOOD BY AUTOMATED COUNT: 52.3 FL (ref 35.9–50)
ERYTHROCYTE [SEDIMENTATION RATE] IN BLOOD BY WESTERGREN METHOD: 28 MM/HOUR (ref 0–20)
FERRITIN SERPL-MCNC: 81 NG/ML (ref 22–322)
FOLATE SERPL-MCNC: 19.7 NG/ML
GFR SERPL CREATININE-BSD FRML MDRD: 55 ML/MIN/1.73 M 2
GLUCOSE SERPL-MCNC: 90 MG/DL (ref 65–99)
GLUCOSE UR STRIP.AUTO-MCNC: NEGATIVE MG/DL
HCT VFR BLD AUTO: 34.6 % (ref 42–52)
HDLC SERPL-MCNC: 42 MG/DL
HGB BLD-MCNC: 10.8 G/DL (ref 14–18)
IMM GRANULOCYTES # BLD AUTO: 0.01 K/UL (ref 0–0.11)
IMM GRANULOCYTES NFR BLD AUTO: 0.2 % (ref 0–0.9)
IRON SATN MFR SERPL: 7 % (ref 15–55)
IRON SERPL-MCNC: 24 UG/DL (ref 50–180)
KETONES UR STRIP.AUTO-MCNC: NEGATIVE MG/DL
LDLC SERPL CALC-MCNC: 133 MG/DL
LEUKOCYTE ESTERASE UR QL STRIP.AUTO: NEGATIVE
LV EJECT FRACT  99904: 30
LV EJECT FRACT MOD 2C 99903: 30
LV EJECT FRACT MOD 4C 99902: 43.7
LV EJECT FRACT MOD BP 99901: 30
LYMPHOCYTES # BLD AUTO: 1.79 K/UL (ref 1–4.8)
LYMPHOCYTES NFR BLD: 31.2 % (ref 22–41)
MCH RBC QN AUTO: 25.9 PG (ref 27–33)
MCHC RBC AUTO-ENTMCNC: 31.2 G/DL (ref 33.7–35.3)
MCV RBC AUTO: 83 FL (ref 81.4–97.8)
MDMA UR QL SCN: NEGATIVE
METHADONE UR QL SCN: NEGATIVE
MICRO URNS: NORMAL
MONOCYTES # BLD AUTO: 0.94 K/UL (ref 0–0.85)
MONOCYTES NFR BLD AUTO: 16.4 % (ref 0–13.4)
NEUTROPHILS # BLD AUTO: 2.71 K/UL (ref 1.82–7.42)
NEUTROPHILS NFR BLD: 47.2 % (ref 44–72)
NITRITE UR QL STRIP.AUTO: NEGATIVE
NRBC # BLD AUTO: 0 K/UL
NRBC BLD AUTO-RTO: 0 /100 WBC
OPIATES UR QL SCN: NEGATIVE
OXYCODONE UR QL SCN: NEGATIVE
PCP UR QL SCN: NEGATIVE
PH UR STRIP.AUTO: 5 [PH]
PHOSPHATE SERPL-MCNC: 2.4 MG/DL (ref 2.5–4.5)
PHOSPHATE SERPL-MCNC: 2.8 MG/DL (ref 2.5–4.5)
PLATELET # BLD AUTO: 206 K/UL (ref 164–446)
PMV BLD AUTO: 10.7 FL (ref 9–12.9)
POTASSIUM SERPL-SCNC: 3.9 MMOL/L (ref 3.6–5.5)
PROPOXYPH UR QL SCN: NEGATIVE
PROT UR QL STRIP: NEGATIVE MG/DL
PROT UR-MCNC: 5.3 MG/DL (ref 0–15)
RBC # BLD AUTO: 4.17 M/UL (ref 4.7–6.1)
RBC UR QL AUTO: NEGATIVE
SODIUM SERPL-SCNC: 138 MMOL/L (ref 135–145)
SODIUM UR-SCNC: 54 MMOL/L
SP GR UR STRIP.AUTO: 1.01
T4 FREE SERPL-MCNC: 0.85 NG/DL (ref 0.53–1.43)
TIBC SERPL-MCNC: 358 UG/DL (ref 250–450)
TRIGL SERPL-MCNC: 99 MG/DL (ref 0–149)
TSH SERPL DL<=0.005 MIU/L-ACNC: 0.32 UIU/ML (ref 0.3–3.7)
URATE SERPL-MCNC: 8.4 MG/DL (ref 2.5–8.3)
VIT B12 SERPL-MCNC: 895 PG/ML (ref 211–911)
WBC # BLD AUTO: 5.7 K/UL (ref 4.8–10.8)

## 2017-03-21 PROCEDURE — 700105 HCHG RX REV CODE 258: Performed by: HOSPITALIST

## 2017-03-21 PROCEDURE — 51798 US URINE CAPACITY MEASURE: CPT

## 2017-03-21 PROCEDURE — 82746 ASSAY OF FOLIC ACID SERUM: CPT

## 2017-03-21 PROCEDURE — 700102 HCHG RX REV CODE 250 W/ 637 OVERRIDE(OP): Performed by: HOSPITALIST

## 2017-03-21 PROCEDURE — 84443 ASSAY THYROID STIM HORMONE: CPT

## 2017-03-21 PROCEDURE — 84300 ASSAY OF URINE SODIUM: CPT

## 2017-03-21 PROCEDURE — A9270 NON-COVERED ITEM OR SERVICE: HCPCS | Performed by: INTERNAL MEDICINE

## 2017-03-21 PROCEDURE — 82570 ASSAY OF URINE CREATININE: CPT

## 2017-03-21 PROCEDURE — 700111 HCHG RX REV CODE 636 W/ 250 OVERRIDE (IP): Performed by: HOSPITALIST

## 2017-03-21 PROCEDURE — A9502 TC99M TETROFOSMIN: HCPCS

## 2017-03-21 PROCEDURE — 76775 US EXAM ABDO BACK WALL LIM: CPT

## 2017-03-21 PROCEDURE — 36415 COLL VENOUS BLD VENIPUNCTURE: CPT

## 2017-03-21 PROCEDURE — 700102 HCHG RX REV CODE 250 W/ 637 OVERRIDE(OP): Performed by: NURSE PRACTITIONER

## 2017-03-21 PROCEDURE — 80069 RENAL FUNCTION PANEL: CPT

## 2017-03-21 PROCEDURE — A9270 NON-COVERED ITEM OR SERVICE: HCPCS | Performed by: HOSPITALIST

## 2017-03-21 PROCEDURE — 84156 ASSAY OF PROTEIN URINE: CPT | Mod: 59

## 2017-03-21 PROCEDURE — 83550 IRON BINDING TEST: CPT

## 2017-03-21 PROCEDURE — 84100 ASSAY OF PHOSPHORUS: CPT

## 2017-03-21 PROCEDURE — 85025 COMPLETE CBC W/AUTO DIFF WBC: CPT

## 2017-03-21 PROCEDURE — 96365 THER/PROPH/DIAG IV INF INIT: CPT

## 2017-03-21 PROCEDURE — 81003 URINALYSIS AUTO W/O SCOPE: CPT

## 2017-03-21 PROCEDURE — 82728 ASSAY OF FERRITIN: CPT

## 2017-03-21 PROCEDURE — 83540 ASSAY OF IRON: CPT

## 2017-03-21 PROCEDURE — 84439 ASSAY OF FREE THYROXINE: CPT

## 2017-03-21 PROCEDURE — 85652 RBC SED RATE AUTOMATED: CPT

## 2017-03-21 PROCEDURE — 80061 LIPID PANEL: CPT

## 2017-03-21 PROCEDURE — A9270 NON-COVERED ITEM OR SERVICE: HCPCS | Performed by: NURSE PRACTITIONER

## 2017-03-21 PROCEDURE — 99226 PR SUBSEQUENT OBSERVATION CARE,LEVEL III: CPT | Performed by: INTERNAL MEDICINE

## 2017-03-21 PROCEDURE — 700111 HCHG RX REV CODE 636 W/ 250 OVERRIDE (IP)

## 2017-03-21 PROCEDURE — 80307 DRUG TEST PRSMV CHEM ANLYZR: CPT

## 2017-03-21 PROCEDURE — G0378 HOSPITAL OBSERVATION PER HR: HCPCS

## 2017-03-21 PROCEDURE — 84550 ASSAY OF BLOOD/URIC ACID: CPT

## 2017-03-21 PROCEDURE — 700102 HCHG RX REV CODE 250 W/ 637 OVERRIDE(OP): Performed by: INTERNAL MEDICINE

## 2017-03-21 PROCEDURE — 82607 VITAMIN B-12: CPT

## 2017-03-21 PROCEDURE — 83880 ASSAY OF NATRIURETIC PEPTIDE: CPT

## 2017-03-21 PROCEDURE — 700111 HCHG RX REV CODE 636 W/ 250 OVERRIDE (IP): Performed by: INTERNAL MEDICINE

## 2017-03-21 RX ORDER — REGADENOSON 0.08 MG/ML
INJECTION, SOLUTION INTRAVENOUS
Status: COMPLETED
Start: 2017-03-21 | End: 2017-03-21

## 2017-03-21 RX ORDER — FERROUS GLUCONATE 324(38)MG
324 TABLET ORAL
Status: DISCONTINUED | OUTPATIENT
Start: 2017-03-22 | End: 2017-03-24 | Stop reason: HOSPADM

## 2017-03-21 RX ORDER — OMEPRAZOLE 20 MG/1
20 CAPSULE, DELAYED RELEASE ORAL DAILY
Status: DISCONTINUED | OUTPATIENT
Start: 2017-03-21 | End: 2017-03-24 | Stop reason: HOSPADM

## 2017-03-21 RX ORDER — ATORVASTATIN CALCIUM 40 MG/1
40 TABLET, FILM COATED ORAL
Status: DISCONTINUED | OUTPATIENT
Start: 2017-03-21 | End: 2017-03-24 | Stop reason: HOSPADM

## 2017-03-21 RX ORDER — ASPIRIN 81 MG/1
81 TABLET, CHEWABLE ORAL DAILY
Status: DISCONTINUED | OUTPATIENT
Start: 2017-03-21 | End: 2017-03-24 | Stop reason: HOSPADM

## 2017-03-21 RX ORDER — LORAZEPAM 0.5 MG/1
.5-1 TABLET ORAL
Status: DISCONTINUED | OUTPATIENT
Start: 2017-03-21 | End: 2017-03-24 | Stop reason: HOSPADM

## 2017-03-21 RX ORDER — FOLIC ACID 1 MG/1
1 TABLET ORAL DAILY
Status: DISCONTINUED | OUTPATIENT
Start: 2017-03-21 | End: 2017-03-24 | Stop reason: HOSPADM

## 2017-03-21 RX ORDER — ATORVASTATIN CALCIUM 80 MG/1
80 TABLET, FILM COATED ORAL
Status: DISCONTINUED | OUTPATIENT
Start: 2017-03-21 | End: 2017-03-21

## 2017-03-21 RX ORDER — LISINOPRIL 5 MG/1
5 TABLET ORAL
Status: DISCONTINUED | OUTPATIENT
Start: 2017-03-21 | End: 2017-03-22

## 2017-03-21 RX ORDER — CARVEDILOL 3.12 MG/1
3.12 TABLET ORAL 2 TIMES DAILY WITH MEALS
Status: DISCONTINUED | OUTPATIENT
Start: 2017-03-21 | End: 2017-03-22

## 2017-03-21 RX ADMIN — STANDARDIZED SENNA CONCENTRATE AND DOCUSATE SODIUM 2 TABLET: 8.6; 5 TABLET, FILM COATED ORAL at 21:34

## 2017-03-21 RX ADMIN — THIAMINE HYDROCHLORIDE 100 MG: 100 INJECTION, SOLUTION INTRAMUSCULAR; INTRAVENOUS at 15:16

## 2017-03-21 RX ADMIN — HEPARIN SODIUM 5000 UNITS: 5000 INJECTION, SOLUTION INTRAVENOUS; SUBCUTANEOUS at 21:35

## 2017-03-21 RX ADMIN — HEPARIN SODIUM 5000 UNITS: 5000 INJECTION, SOLUTION INTRAVENOUS; SUBCUTANEOUS at 15:18

## 2017-03-21 RX ADMIN — ATORVASTATIN CALCIUM 40 MG: 40 TABLET, FILM COATED ORAL at 21:34

## 2017-03-21 RX ADMIN — FOLIC ACID 1 MG: 1 TABLET ORAL at 15:17

## 2017-03-21 RX ADMIN — OMEPRAZOLE 20 MG: 20 CAPSULE, DELAYED RELEASE ORAL at 18:48

## 2017-03-21 RX ADMIN — REGADENOSON 0.4 MG: 0.08 INJECTION, SOLUTION INTRAVENOUS at 13:48

## 2017-03-21 RX ADMIN — THERA TABS 1 TABLET: TAB at 15:19

## 2017-03-21 RX ADMIN — LISINOPRIL 5 MG: 5 TABLET ORAL at 18:49

## 2017-03-21 RX ADMIN — SODIUM CHLORIDE: 9 INJECTION, SOLUTION INTRAVENOUS at 05:27

## 2017-03-21 RX ADMIN — HEPARIN SODIUM 5000 UNITS: 5000 INJECTION, SOLUTION INTRAVENOUS; SUBCUTANEOUS at 05:27

## 2017-03-21 RX ADMIN — ASPIRIN 81 MG: 81 TABLET, CHEWABLE ORAL at 18:48

## 2017-03-21 RX ADMIN — CARVEDILOL 3.12 MG: 3.12 TABLET, FILM COATED ORAL at 17:12

## 2017-03-21 ASSESSMENT — ENCOUNTER SYMPTOMS
SPEECH CHANGE: 0
WEAKNESS: 1
CHILLS: 0
DIZZINESS: 0
ABDOMINAL PAIN: 0
SORE THROAT: 0
HEADACHES: 1
BACK PAIN: 0
DIARRHEA: 0
WHEEZING: 0
PALPITATIONS: 0
FOCAL WEAKNESS: 0
BLOOD IN STOOL: 0
NAUSEA: 0
MYALGIAS: 0
ORTHOPNEA: 0
HEADACHES: 0
FEVER: 0
SENSORY CHANGE: 0
VOMITING: 0
NERVOUS/ANXIOUS: 1
CONSTIPATION: 0
COUGH: 0
SHORTNESS OF BREATH: 0

## 2017-03-21 ASSESSMENT — LIFESTYLE VARIABLES
EVER HAD A DRINK FIRST THING IN THE MORNING TO STEADY YOUR NERVES TO GET RID OF A HANGOVER: NO
EVER_SMOKED: YES
TOTAL SCORE: 0
TOTAL SCORE: 0
EVER FELT BAD OR GUILTY ABOUT YOUR DRINKING: NO
HAVE YOU EVER FELT YOU SHOULD CUT DOWN ON YOUR DRINKING: NO
AVERAGE NUMBER OF DAYS PER WEEK YOU HAVE A DRINK CONTAINING ALCOHOL: 1
PACK_YEARS: SOCIAL
HOW MANY TIMES IN THE PAST YEAR HAVE YOU HAD 5 OR MORE DRINKS IN A DAY: 0
ALCOHOL_USE: YES
TOTAL SCORE: 0
ON A TYPICAL DAY WHEN YOU DRINK ALCOHOL HOW MANY DRINKS DO YOU HAVE: 2
HAVE PEOPLE ANNOYED YOU BY CRITICIZING YOUR DRINKING: NO
CONSUMPTION TOTAL: NEGATIVE

## 2017-03-21 ASSESSMENT — PAIN SCALES - GENERAL
PAINLEVEL_OUTOF10: 0

## 2017-03-21 NOTE — PROGRESS NOTES
Patient resting in bed with no signs of discomfort or distress. Respirations are even and unlabored. Call light within reach. Bed in low position, wheels locked, side rails up x2. Will continue to monitor.

## 2017-03-21 NOTE — CARE PLAN
Problem: Safety  Goal: Will remain free from injury  Outcome: PROGRESSING AS EXPECTED  Patient has been educated and verbalizes understanding of needing to call for assistance. Patient is steady on feet and only needs stand by. Patient calls appropriately for assistance. Tredded slipper socks on. Call light within reach. Bed in low position, wheels locked, and side rails up x2.

## 2017-03-21 NOTE — PROGRESS NOTES
Received report and assumed care of patient. Patient is alert and oriented x4. Patient is in no signs of distress and denies pain at this time. Patient was updated on the plan of care for the day. Call light within reach, bed in low position, 2 side rails up. Educated on fall risk, verbalizes understanding. Will continue to monitor

## 2017-03-21 NOTE — PROGRESS NOTES
Report received from jay RN, pt still in ED. Pt arrived to floor via gurney with pt transport, assumed care of patient. Patient A&O x 4, no signs of distress noted. All LDAs assessed. Discussed POC with patient and all questions answered at this time. Denies pain at this time. Bed alarm on. Bed in lowest position, upper side rails up. Non-skid socks in place. Call light within reach. Personal possessions in reach. Will continue to monitor pt status.

## 2017-03-21 NOTE — CARE PLAN
Problem: Communication  Goal: The ability to communicate needs accurately and effectively will improve  Intervention: [place] patient and significant other/support system to call light to alert staff of needs  [place] pt to room and call light upon arrival to floor.       Problem: Safety  Goal: Will remain free from falls  Intervention: Assess risk factors for falls  Assess pt mobility status and implement fall precautions as needed.

## 2017-03-21 NOTE — PROGRESS NOTES
Cardiology Progress Note               Author: Ciera Schwartzn Date & Time created: 3/21/2017  4:50 PM     Interval History:  66 year old with Hx of chronic back pain who presented with chest pain and SOB, EF on echo was 30      3/21:  134/88  Hr 92    Creatine 1.31    HR did rise during MPI  Pt sleeping, awakens easily   No cardiac complaints, CO being tired and headache    CONCLUSIONS  No prior study is available for comparison.   Severely reduced left ventricular systolic function.  Left ventricular ejection fraction is visually estimated to be 30%.  Grade I diastolic dysfunction.  Normal right ventricular size.  Structurally normal aortic valve.  Mild mitral regurgitation.  Thickened mitral valve leaflets.  Trivial pericardial effusion.  Discussed results with patient's nurse and hospitalist via phone.      Review of Systems   Constitutional: Positive for malaise/fatigue. Negative for fever and chills.   HENT: Negative for congestion and sore throat.    Respiratory: Negative for cough and shortness of breath.    Cardiovascular: Negative for chest pain and palpitations.   Gastrointestinal: Negative for nausea, vomiting and diarrhea.   Genitourinary: Negative for dysuria.   Musculoskeletal: Negative for myalgias.   Skin: Negative for rash.   Neurological: Positive for headaches. Negative for dizziness.       Physical Exam   Constitutional: He is oriented to person, place, and time. He appears well-developed and well-nourished. No distress.   HENT:   Head: Normocephalic and atraumatic.   Neck: Normal range of motion.   Cardiovascular: Normal rate and regular rhythm.  Exam reveals distant heart sounds.    Pulmonary/Chest: Effort normal and breath sounds normal. No respiratory distress. He has no wheezes.   Musculoskeletal: Normal range of motion. He exhibits no edema.   Neurological: He is alert and oriented to person, place, and time.   Skin: Skin is warm and dry.   Psychiatric: He has a normal mood and affect. His  behavior is normal. Judgment and thought content normal.   Nursing note and vitals reviewed.      Hemodynamics:  Temp (24hrs), Av.9 °C (98.5 °F), Min:36.6 °C (97.8 °F), Max:37.4 °C (99.3 °F)  Temperature: 37.4 °C (99.3 °F)  Pulse  Av.5  Min: 83  Max: 100Heart Rate (Monitored): 91  Blood Pressure : 134/88 mmHg, NIBP: 116/68 mmHg     Respiratory:    Respiration: 17, Pulse Oximetry: 94 %           Fluids:     Weight: 75.7 kg (166 lb 14.2 oz)  GI/Nutrition:  Orders Placed This Encounter   Procedures   • DIET ORDER     Standing Status: Standing      Number of Occurrences: 1      Standing Expiration Date:      Order Specific Question:  Diet:     Answer:  Regular [1]     Order Specific Question:  Miscellaneous modifications:     Answer:  No Decaf, No Caffeine(for test) [11]      Comments:  Protocol 1313 Patient to have no caffeine for 12 hours prior to exam (decaf, coffee, cola, tea, chocolate)     Lab Results:  Recent Labs      17   1008  17   0103   WBC  4.6*  5.7   RBC  4.28*  4.17*   HEMOGLOBIN  11.5*  10.8*   HEMATOCRIT  36.3*  34.6*   MCV  84.8  83.0   MCH  26.9*  25.9*   MCHC  31.7*  31.2*   RDW  53.1*  52.3*   PLATELETCT  216  206   MPV  10.2  10.7     Recent Labs      17   1008  17   0103   SODIUM  136  138   POTASSIUM  4.1  3.9   CHLORIDE  105  108   CO2  25  20   GLUCOSE  94  90   BUN  15  12   CREATININE  1.58*  1.31   CALCIUM  9.2  8.5     Recent Labs      17   1008   APTT  26.2   INR  0.95     Recent Labs      17   1008  17   1200   BNPBTYPENAT  95  245*     Recent Labs      17   1008  17   1748  17   1200   TROPONINI  0.03  0.02   --    BNPBTYPENAT  95   --   245*     Recent Labs      17   0103   TRIGLYCERIDE  99   HDL  42   LDL  133*         Medical Decision Making, by Problem:  Active Hospital Problems    Diagnosis   • Palpitations [R00.2]   • DELAROSA (dyspnea on exertion) [R06.09]   • Near syncope [R55]   • Abdominal distension  (gaseous) [R14.0]   • JUAN CARLOS (acute kidney injury) (CMS-HCC) [N17.9]       Plan:  DELAROSA:  EF on echo 35%  Start on coreg 3.125, plan to titrate up as tolerated  ? ACE     Decrease lipitor to 40mg ,   ? ASA  No cath at this point  Awaiting MPI full results,      Case and plan with Dr. Price  Core Measures

## 2017-03-22 ENCOUNTER — APPOINTMENT (OUTPATIENT)
Dept: RADIOLOGY | Facility: MEDICAL CENTER | Age: 67
End: 2017-03-22
Attending: INTERNAL MEDICINE
Payer: MEDICARE

## 2017-03-22 LAB
ANION GAP SERPL CALC-SCNC: 7 MMOL/L (ref 0–11.9)
BUN SERPL-MCNC: 8 MG/DL (ref 8–22)
CALCIUM SERPL-MCNC: 9.3 MG/DL (ref 8.5–10.5)
CHLORIDE SERPL-SCNC: 104 MMOL/L (ref 96–112)
CO2 SERPL-SCNC: 24 MMOL/L (ref 20–33)
CREAT SERPL-MCNC: 1.43 MG/DL (ref 0.5–1.4)
EKG IMPRESSION: NORMAL
ERYTHROCYTE [DISTWIDTH] IN BLOOD BY AUTOMATED COUNT: 53.1 FL (ref 35.9–50)
FLUAV H1 2009 PAND RNA SPEC QL NAA+PROBE: NOT DETECTED
FLUAV RNA SPEC QL NAA+PROBE: POSITIVE
FLUBV RNA SPEC QL NAA+PROBE: NEGATIVE
GFR SERPL CREATININE-BSD FRML MDRD: 50 ML/MIN/1.73 M 2
GLUCOSE SERPL-MCNC: 106 MG/DL (ref 65–99)
HCT VFR BLD AUTO: 37.8 % (ref 42–52)
HGB BLD-MCNC: 11.8 G/DL (ref 14–18)
MCH RBC QN AUTO: 26.3 PG (ref 27–33)
MCHC RBC AUTO-ENTMCNC: 31.2 G/DL (ref 33.7–35.3)
MCV RBC AUTO: 84.4 FL (ref 81.4–97.8)
PLATELET # BLD AUTO: 223 K/UL (ref 164–446)
PMV BLD AUTO: 10.4 FL (ref 9–12.9)
POTASSIUM SERPL-SCNC: 4.7 MMOL/L (ref 3.6–5.5)
RBC # BLD AUTO: 4.48 M/UL (ref 4.7–6.1)
SODIUM SERPL-SCNC: 135 MMOL/L (ref 135–145)
WBC # BLD AUTO: 4.9 K/UL (ref 4.8–10.8)

## 2017-03-22 PROCEDURE — A9270 NON-COVERED ITEM OR SERVICE: HCPCS | Performed by: INTERNAL MEDICINE

## 2017-03-22 PROCEDURE — 85027 COMPLETE CBC AUTOMATED: CPT

## 2017-03-22 PROCEDURE — 700102 HCHG RX REV CODE 250 W/ 637 OVERRIDE(OP): Performed by: INTERNAL MEDICINE

## 2017-03-22 PROCEDURE — 700111 HCHG RX REV CODE 636 W/ 250 OVERRIDE (IP): Performed by: INTERNAL MEDICINE

## 2017-03-22 PROCEDURE — 700102 HCHG RX REV CODE 250 W/ 637 OVERRIDE(OP): Performed by: NURSE PRACTITIONER

## 2017-03-22 PROCEDURE — 80048 BASIC METABOLIC PNL TOTAL CA: CPT

## 2017-03-22 PROCEDURE — 87040 BLOOD CULTURE FOR BACTERIA: CPT

## 2017-03-22 PROCEDURE — 71010 DX-CHEST-LIMITED (1 VIEW): CPT

## 2017-03-22 PROCEDURE — G0378 HOSPITAL OBSERVATION PER HR: HCPCS

## 2017-03-22 PROCEDURE — 87503 INFLUENZA DNA AMP PROB ADDL: CPT

## 2017-03-22 PROCEDURE — 96367 TX/PROPH/DG ADDL SEQ IV INF: CPT

## 2017-03-22 PROCEDURE — 90670 PCV13 VACCINE IM: CPT | Performed by: INTERNAL MEDICINE

## 2017-03-22 PROCEDURE — 700111 HCHG RX REV CODE 636 W/ 250 OVERRIDE (IP): Performed by: HOSPITALIST

## 2017-03-22 PROCEDURE — 36415 COLL VENOUS BLD VENIPUNCTURE: CPT

## 2017-03-22 PROCEDURE — 700105 HCHG RX REV CODE 258: Performed by: INTERNAL MEDICINE

## 2017-03-22 PROCEDURE — 99226 PR SUBSEQUENT OBSERVATION CARE,LEVEL III: CPT | Performed by: INTERNAL MEDICINE

## 2017-03-22 PROCEDURE — 90471 IMMUNIZATION ADMIN: CPT

## 2017-03-22 PROCEDURE — 90662 IIV NO PRSV INCREASED AG IM: CPT | Performed by: INTERNAL MEDICINE

## 2017-03-22 PROCEDURE — 87502 INFLUENZA DNA AMP PROBE: CPT

## 2017-03-22 PROCEDURE — A9270 NON-COVERED ITEM OR SERVICE: HCPCS | Performed by: NURSE PRACTITIONER

## 2017-03-22 RX ORDER — LISINOPRIL 5 MG/1
2.5 TABLET ORAL
Status: DISCONTINUED | OUTPATIENT
Start: 2017-03-23 | End: 2017-03-24 | Stop reason: HOSPADM

## 2017-03-22 RX ORDER — DIGOXIN 125 MCG
250 TABLET ORAL DAILY
Status: DISCONTINUED | OUTPATIENT
Start: 2017-03-22 | End: 2017-03-24 | Stop reason: HOSPADM

## 2017-03-22 RX ORDER — DIGOXIN 125 MCG
250 TABLET ORAL DAILY
Status: DISCONTINUED | OUTPATIENT
Start: 2017-03-22 | End: 2017-03-22

## 2017-03-22 RX ORDER — SPIRONOLACTONE 25 MG/1
12.5 TABLET ORAL
Status: DISCONTINUED | OUTPATIENT
Start: 2017-03-22 | End: 2017-03-24 | Stop reason: HOSPADM

## 2017-03-22 RX ORDER — ACETAMINOPHEN 325 MG/1
650 TABLET ORAL EVERY 6 HOURS PRN
Status: DISCONTINUED | OUTPATIENT
Start: 2017-03-22 | End: 2017-03-24 | Stop reason: HOSPADM

## 2017-03-22 RX ORDER — DIGOXIN 125 MCG
250 TABLET ORAL EVERY MORNING
Status: DISCONTINUED | OUTPATIENT
Start: 2017-03-22 | End: 2017-03-22

## 2017-03-22 RX ORDER — CARVEDILOL 6.25 MG/1
6.25 TABLET ORAL 2 TIMES DAILY WITH MEALS
Status: DISCONTINUED | OUTPATIENT
Start: 2017-03-22 | End: 2017-03-23

## 2017-03-22 RX ADMIN — THIAMINE HYDROCHLORIDE 100 MG: 100 INJECTION, SOLUTION INTRAMUSCULAR; INTRAVENOUS at 08:37

## 2017-03-22 RX ADMIN — THERA TABS 1 TABLET: TAB at 07:22

## 2017-03-22 RX ADMIN — HEPARIN SODIUM 5000 UNITS: 5000 INJECTION, SOLUTION INTRAVENOUS; SUBCUTANEOUS at 14:48

## 2017-03-22 RX ADMIN — OMEPRAZOLE 20 MG: 20 CAPSULE, DELAYED RELEASE ORAL at 07:23

## 2017-03-22 RX ADMIN — INFLUENZA A VIRUS A/CALIFORNIA/7/2009 X-179A (H1N1) ANTIGEN (FORMALDEHYDE INACTIVATED), INFLUENZA A VIRUS A/HONG KONG/4801/2014 X-263B (H3N2) ANTIGEN (FORMALDEHYDE INACTIVATED), AND INFLUENZA B VIRUS B/BRISBANE/60/2008 ANTIGEN (FORMALDEHYDE INACTIVATED) 0.5 ML: 60; 60; 60 INJECTION, SUSPENSION INTRAMUSCULAR at 05:56

## 2017-03-22 RX ADMIN — ASPIRIN 81 MG: 81 TABLET, CHEWABLE ORAL at 07:22

## 2017-03-22 RX ADMIN — PNEUMOCOCCAL 13-VALENT CONJUGATE VACCINE 0.5 ML: 2.2; 2.2; 2.2; 2.2; 2.2; 4.4; 2.2; 2.2; 2.2; 2.2; 2.2; 2.2; 2.2 INJECTION, SUSPENSION INTRAMUSCULAR at 05:57

## 2017-03-22 RX ADMIN — FERROUS GLUCONATE 324 MG: 324 TABLET ORAL at 07:22

## 2017-03-22 RX ADMIN — ROSUVASTATIN CALCIUM 250 MCG: 10 TABLET, FILM COATED ORAL at 10:35

## 2017-03-22 RX ADMIN — CARVEDILOL 6.25 MG: 6.25 TABLET, FILM COATED ORAL at 17:49

## 2017-03-22 RX ADMIN — ACETAMINOPHEN 650 MG: 325 TABLET, FILM COATED ORAL at 17:49

## 2017-03-22 RX ADMIN — SPIRONOLACTONE 12.5 MG: 25 TABLET, FILM COATED ORAL at 10:35

## 2017-03-22 RX ADMIN — HEPARIN SODIUM 5000 UNITS: 5000 INJECTION, SOLUTION INTRAVENOUS; SUBCUTANEOUS at 22:20

## 2017-03-22 RX ADMIN — ATORVASTATIN CALCIUM 40 MG: 40 TABLET, FILM COATED ORAL at 22:19

## 2017-03-22 RX ADMIN — CEFTRIAXONE 2 G: 2 INJECTION, POWDER, FOR SOLUTION INTRAMUSCULAR; INTRAVENOUS at 17:49

## 2017-03-22 RX ADMIN — HEPARIN SODIUM 5000 UNITS: 5000 INJECTION, SOLUTION INTRAVENOUS; SUBCUTANEOUS at 05:14

## 2017-03-22 RX ADMIN — CARVEDILOL 6.25 MG: 6.25 TABLET, FILM COATED ORAL at 07:26

## 2017-03-22 RX ADMIN — FOLIC ACID 1 MG: 1 TABLET ORAL at 07:22

## 2017-03-22 ASSESSMENT — ENCOUNTER SYMPTOMS
FEVER: 0
CHILLS: 0
MUSCULOSKELETAL NEGATIVE: 1
DEPRESSION: 0
PALPITATIONS: 0
INSOMNIA: 0
SHORTNESS OF BREATH: 0
COUGH: 0
NERVOUS/ANXIOUS: 1
WEAKNESS: 1
HEARTBURN: 0
CONSTIPATION: 0
SPEECH CHANGE: 0
EYES NEGATIVE: 1
NERVOUS/ANXIOUS: 0
HEADACHES: 0
WEIGHT LOSS: 0
VOMITING: 0
ORTHOPNEA: 0
BRUISES/BLEEDS EASILY: 0
DIARRHEA: 0
FOCAL WEAKNESS: 0
BLOOD IN STOOL: 0
ABDOMINAL PAIN: 0
BACK PAIN: 0
DIZZINESS: 0
MYALGIAS: 0
NAUSEA: 0
SENSORY CHANGE: 0
WHEEZING: 0

## 2017-03-22 ASSESSMENT — PAIN SCALES - GENERAL
PAINLEVEL_OUTOF10: 0
PAINLEVEL_OUTOF10: 0

## 2017-03-22 NOTE — PROGRESS NOTES
Hospital Medicine Progress Note, Adult, Complex               Author: Chrissy Prescott Date & Time created: 3/22/2017  7:25 AM     ID/CC: 66 M with c/o DELAROSA found to have Cardiomyopathy -LVEF 30%    Interval History:  Pt had no complaints, denied chest pain , sob.  He is on carvedilol, digoxin, lisinopril, spironolactone per cardiology.  Later evening he is spiking fever of 101.2, no source identified. Getting blood C &S, cxr,influenza since he c/o malaise and fatigue now, ua was neg and no dysuria, empirically started on iv C3. Will follow  POC discussed with pt and bed side RN    Review of Systems:  Review of Systems   Constitutional: Negative for fever, chills and malaise/fatigue.   HENT: Negative for congestion.    Respiratory: Negative for cough, shortness of breath and wheezing.    Cardiovascular: Negative for chest pain, palpitations, orthopnea and leg swelling.   Gastrointestinal: Negative for nausea, vomiting, abdominal pain, diarrhea, constipation, blood in stool and melena.   Genitourinary: Negative for dysuria and urgency.   Musculoskeletal: Negative for myalgias and back pain.   Neurological: Positive for weakness. Negative for dizziness, sensory change, speech change, focal weakness and headaches.   Psychiatric/Behavioral: The patient is nervous/anxious.        Physical Exam:  Physical Exam   Constitutional: He is oriented to person, place, and time. No distress.   HENT:   Head: Normocephalic and atraumatic.   Eyes: EOM are normal. Right eye exhibits no discharge. Left eye exhibits no discharge. No scleral icterus.   Neck: Neck supple. No JVD present.   Cardiovascular: Normal rate and regular rhythm.    No murmur heard.  Pulmonary/Chest: Effort normal and breath sounds normal. No stridor. No respiratory distress. He has no wheezes. He has no rales.   Abdominal: Soft. Bowel sounds are normal. He exhibits no distension. There is no tenderness.   Musculoskeletal: He exhibits no edema or tenderness.    Neurological: He is alert and oriented to person, place, and time.   Skin: Skin is warm and dry. He is not diaphoretic.   Psychiatric:   Poor historian    Nursing note and vitals reviewed.      Labs:        Invalid input(s): [id number]  Recent Labs      17   1008  17   1748  17   1200   TROPONINI  0.03  0.02   --    BNPBTYPENAT  95   --   245*     Recent Labs      17   1008  17   0103  17   1200   SODIUM  136  138   --    POTASSIUM  4.1  3.9   --    CHLORIDE  105  108   --    CO2  25  20   --    BUN  15  12   --    CREATININE  1.58*  1.31   --    PHOSPHORUS   --   2.8  2.4*   CALCIUM  9.2  8.5   --      Recent Labs      17   1008  17   0103   ALTSGPT  11   --    ASTSGOT  18   --    ALKPHOSPHAT  51   --    TBILIRUBIN  0.4   --    LIPASE  59   --    GLUCOSE  94  90     Recent Labs      17   1008  17   0103  17   1200   RBC  4.28*  4.17*   --    HEMOGLOBIN  11.5*  10.8*   --    HEMATOCRIT  36.3*  34.6*   --    PLATELETCT  216  206   --    PROTHROMBTM  13.0   --    --    APTT  26.2   --    --    INR  0.95   --    --    IRON   --    --   24*   FERRITIN   --    --   81.0   TOTIRONBC   --    --   358     Recent Labs      17   1008  17   0103   WBC  4.6*  5.7   NEUTSPOLYS  36.30*  47.20   LYMPHOCYTES  32.10  31.20   MONOCYTES  25.80*  16.40*   EOSINOPHILS  4.40  4.50   BASOPHILS  0.70  0.50   ASTSGOT  18   --    ALTSGPT  11   --    ALKPHOSPHAT  51   --    TBILIRUBIN  0.4   --            Hemodynamics:  Temp (24hrs), Av.9 °C (98.5 °F), Min:36.6 °C (97.8 °F), Max:37.4 °C (99.3 °F)  Temperature: 36.8 °C (98.3 °F)  Pulse  Av.7  Min: 81  Max: 107   Blood Pressure : 116/74 mmHg     Respiratory:    Respiration: 18, Pulse Oximetry: 93 %           Fluids:    Intake/Output Summary (Last 24 hours) at 17 0725  Last data filed at 17 0000   Gross per 24 hour   Intake    640 ml   Output    990 ml   Net   -350 ml     Weight: 77.5 kg (170  lb 13.7 oz)  GI/Nutrition:  Orders Placed This Encounter   Procedures   • DIET ORDER     Standing Status: Standing      Number of Occurrences: 1      Standing Expiration Date:      Order Specific Question:  Diet:     Answer:  Regular [1]     Order Specific Question:  Miscellaneous modifications:     Answer:  No Decaf, No Caffeine(for test) [11]      Comments:  Protocol 1313 Patient to have no caffeine for 12 hours prior to exam (decaf, coffee, cola, tea, chocolate)     Medical Decision Making, by Problem:  Active Hospital Problems    Diagnosis   • *DELAROSA (dyspnea on exertion) [R06.09]  Likely from underlying cardiomyopathy  Appears euvolumic clinically     • Cardiomyopathy (CMS-[facility]) [I42.9]  ECHO LVEF 30%-New diagnosis  MPI pending  Cardiology Dr.Letita Price  Started on asa, Lipitor 40, lisinopril 5 mg, Carvedilol 3.125 mg, spironolactone 25   ? Possible etoh related  On iv thiamine   Counseled etoh cessation  Utox neg     • Near syncope [R55]pt is a poor historian'  Follow on tele  ECHO LVEF 30%  Repeat ECHO tomorrow when HR controlled      • JUAN CARLOS (acute kidney injury) (CMS-[facility]) [N17.9]  JUAN CARLOS on CKD-3  Creatinine improved  Avoid nephrotoxins  Renal U/S negative     • Iron deficiency anemia [D50.9]  Per iron studies  Started on iron supplements  Will need op pcp follow, possible colonoscopy later as op     • Alcohol dependence (CMS-[facility]) [F10.20]counseled cessation  On iv thiamine, mvi, folate, prn ativan         Labs reviewed, EKG reviewed, Medications reviewed and Radiology images reviewed  De Luna catheter: No De Luna      DVT Prophylaxis: Heparin    Ulcer prophylaxis: Yes    Assessed for rehab: Patient returned to prior level of function, rehabilitation not indicated at this time

## 2017-03-22 NOTE — DISCHARGE PLANNING
Medical Social Work    Update: LSW met with pt to see if he needed assistance with his animals at home. Pt states they are fine and have a big bag of food.

## 2017-03-22 NOTE — CARE PLAN
Problem: Safety  Goal: Will remain free from injury  Outcome: PROGRESSING AS EXPECTED    Problem: Knowledge Deficit  Goal: Knowledge of disease process/condition, treatment plan, diagnostic tests, and medications will improve  Outcome: PROGRESSING AS EXPECTED  Patient given heart failure packet, educated on all key points, shown educational video on television for heart failure, will continue to reiterate points

## 2017-03-22 NOTE — CARE PLAN
Problem: Acute Care of the Heart Failure Patient  Goal: Optimal Outcome for the HF Patient  Intervention: HF Education: Activity, Low Salt Diet, Follow Up Appointment Scheduled, Medications, Worsening Symptoms, Daily Weight monitoring  Patient received heart failure packet, educated on key points of heart failure, shown educational video on television about heart failure, Patient verbalizes understanding

## 2017-03-22 NOTE — PROGRESS NOTES
Cardiology Progress Note               Author: Mervat Price Date & Time created: 3/22/2017  9:42 AM     Interval History:    Feels well  Eating well    Review of Systems   Constitutional: Negative for weight loss and malaise/fatigue.   Eyes: Negative.    Respiratory: Negative for cough and shortness of breath.    Cardiovascular: Negative for chest pain and leg swelling.        None since admit   Gastrointestinal: Negative for heartburn and nausea.   Musculoskeletal: Negative.    Neurological: Negative for dizziness.   Endo/Heme/Allergies: Does not bruise/bleed easily.   Psychiatric/Behavioral: Negative for depression. The patient is not nervous/anxious and does not have insomnia.        Physical Exam   Constitutional: He is oriented to person, place, and time. He appears well-nourished.   Eyes: EOM are normal. Pupils are equal, round, and reactive to light. No scleral icterus.   Neck: No JVD present. No thyromegaly present.   Cardiovascular: Intact distal pulses.    No murmur heard.  Rates still 90s   Pulmonary/Chest: Breath sounds normal.   Abdominal: Soft. Bowel sounds are normal.   Musculoskeletal: He exhibits no edema.   Neurological: He is alert and oriented to person, place, and time. He exhibits normal muscle tone.   Skin: Skin is warm and dry. No rash noted.   Psychiatric: He has a normal mood and affect. His behavior is normal.       Hemodynamics:  Temp (24hrs), Av °C (98.6 °F), Min:36.8 °C (98.2 °F), Max:37.4 °C (99.3 °F)  Temperature: 36.8 °C (98.2 °F)  Pulse  Av.5  Min: 81  Max: 107   Blood Pressure : 106/73 mmHg     Respiratory:    Respiration: 16, Pulse Oximetry: 97 %           Fluids:     Weight: 77.5 kg (170 lb 13.7 oz)  GI/Nutrition:  Orders Placed This Encounter   Procedures   • DIET ORDER     Standing Status: Standing      Number of Occurrences: 1      Standing Expiration Date:      Order Specific Question:  Diet:     Answer:  Regular [1]     Order Specific Question:  Miscellaneous  "modifications:     Answer:  No Decaf, No Caffeine(for test) [11]      Comments:  Protocol 1313 Patient to have no caffeine for 12 hours prior to exam (decaf, coffee, cola, tea, chocolate)     Lab Results:  Recent Labs      03/20/17   1008  03/21/17   0103  03/22/17   0807   WBC  4.6*  5.7  4.9   RBC  4.28*  4.17*  4.48*   HEMOGLOBIN  11.5*  10.8*  11.8*   HEMATOCRIT  36.3*  34.6*  37.8*   MCV  84.8  83.0  84.4   MCH  26.9*  25.9*  26.3*   MCHC  31.7*  31.2*  31.2*   RDW  53.1*  52.3*  53.1*   PLATELETCT  216  206  223   MPV  10.2  10.7  10.4     Recent Labs      03/20/17   1008  03/21/17   0103  03/22/17   0807   SODIUM  136  138  135   POTASSIUM  4.1  3.9  4.7   CHLORIDE  105  108  104   CO2  25  20  24   GLUCOSE  94  90  106*   BUN  15  12  8   CREATININE  1.58*  1.31  1.43*   CALCIUM  9.2  8.5  9.3     Recent Labs      03/20/17   1008   APTT  26.2   INR  0.95     Recent Labs      03/20/17   1008  03/21/17   1200   BNPBTYPENAT  95  245*     Recent Labs      03/20/17   1008  03/20/17   1748  03/21/17   1200   TROPONINI  0.03  0.02   --    BNPBTYPENAT  95   --   245*     Recent Labs      03/21/17   0103   TRIGLYCERIDE  99   HDL  42   LDL  133*         Medical Decision Making, by Problem:  Active Hospital Problems    Diagnosis   • *DELAROSA (dyspnea on exertion) [R06.09]   • Cardiomyopathy (CMS-HCC) [I42.9]   • Near syncope [R55]   • JUAN CARLOS (acute kidney injury) (CMS-HCC) [N17.9]   • Iron deficiency anemia [D50.9]   • Alcohol dependence (CMS-HCC) [F10.20]       Plan:    65yo transient gentleman, in [place], homeless, has Medicaid - SOB with intercouse & palps brought him as a walking to the ER 3/20    Only noted finding is mild sinus tach  Denies drugs, only minimal ETOH  BMP was normal @ admit & trops negative x3    ?CHFrEF, I am skeptical.  No stigmata of CHF on labs or exam.  Reviewing personally his echo & MPI - fast HR with poor filling may be falsely \"lowering\" his estimated LVEF.  Push HR control, repeat limited echo im " the am.  If tryly EF <40 - maximize CHF meds, f/u in our CHF clinic and may warrant invasive workup if persists.  No high risk features of ischemia on MPI, no evidence of ACS at this time    He has health insurance, but poor access to care.  Addressed    Core Measures

## 2017-03-22 NOTE — DISCHARGE PLANNING
Medical Social Work    Update: Pt has a new PCP appointment with Toñito KOROMA on March 1 at 9:20am.

## 2017-03-22 NOTE — PROGRESS NOTES
Hospital Medicine Progress Note, Adult, Complex               Author: Chrissy RICKI Prescott Date & Time created: 3/21/2017  6:38 PM     ID/CC: 66 M with c/o DELAROSA found to have Cardiomyopathy -LVEF 30%    Interval History:  Pt denied chest pain, sob at rest. He was irritable and anxious that he was npo for stress test.  ECHO showed LVEF 30%, discussed with pt.  Appears euvolumic clinically  Cardiology Dr.Letita Price  Started on asa, Lipitor 40, lisinopril 5 mg, Carvedilol 3.125 mg  ? Possible etoh related  On iv thiamine   Counseled etoh cessation  Utox neg  JUAN CARLOS on CKD-3-Creatinine improved,Renal U/S negative.  POC discussed with pt and bed side RN    Review of Systems:  Review of Systems   Constitutional: Negative for fever, chills and malaise/fatigue.   HENT: Negative for congestion.    Respiratory: Negative for cough, shortness of breath and wheezing.    Cardiovascular: Negative for chest pain, palpitations, orthopnea and leg swelling.   Gastrointestinal: Negative for nausea, vomiting, abdominal pain, diarrhea, constipation, blood in stool and melena.   Genitourinary: Negative for dysuria and urgency.   Musculoskeletal: Negative for myalgias and back pain.   Neurological: Positive for weakness. Negative for dizziness, sensory change, speech change, focal weakness and headaches.   Psychiatric/Behavioral: The patient is nervous/anxious.        Physical Exam:  Physical Exam   Constitutional: He is oriented to person, place, and time. No distress.   HENT:   Head: Normocephalic and atraumatic.   Eyes: EOM are normal. Right eye exhibits no discharge. Left eye exhibits no discharge. No scleral icterus.   Neck: Neck supple. No JVD present.   Cardiovascular: Normal rate and regular rhythm.    No murmur heard.  Pulmonary/Chest: Effort normal and breath sounds normal. No stridor. No respiratory distress. He has no wheezes. He has no rales.   Abdominal: Soft. Bowel sounds are normal. He exhibits no distension. There is no  tenderness.   Musculoskeletal: He exhibits no edema or tenderness.   Neurological: He is alert and oriented to person, place, and time.   Skin: Skin is warm and dry. He is not diaphoretic.   Psychiatric:   Irritable, poor historian    Nursing note and vitals reviewed.      Labs:        Invalid input(s): [id number]  Recent Labs      17   1008  17   1748  17   1200   TROPONINI  0.03  0.02   --    BNPBTYPENAT  95   --   245*     Recent Labs      17   1008  17   0103  17   1200   SODIUM  136  138   --    POTASSIUM  4.1  3.9   --    CHLORIDE  105  108   --    CO2  25  20   --    BUN  15  12   --    CREATININE  1.58*  1.31   --    PHOSPHORUS   --   2.8  2.4*   CALCIUM  9.2  8.5   --      Recent Labs      17   1008  17   0103   ALTSGPT  11   --    ASTSGOT  18   --    ALKPHOSPHAT  51   --    TBILIRUBIN  0.4   --    LIPASE  59   --    GLUCOSE  94  90     Recent Labs      17   1008  17   0103  17   1200   RBC  4.28*  4.17*   --    HEMOGLOBIN  11.5*  10.8*   --    HEMATOCRIT  36.3*  34.6*   --    PLATELETCT  216  206   --    PROTHROMBTM  13.0   --    --    APTT  26.2   --    --    INR  0.95   --    --    IRON   --    --   24*   FERRITIN   --    --   81.0   TOTIRONBC   --    --   358     Recent Labs      17   1008  17   0103   WBC  4.6*  5.7   NEUTSPOLYS  36.30*  47.20   LYMPHOCYTES  32.10  31.20   MONOCYTES  25.80*  16.40*   EOSINOPHILS  4.40  4.50   BASOPHILS  0.70  0.50   ASTSGOT  18   --    ALTSGPT  11   --    ALKPHOSPHAT  51   --    TBILIRUBIN  0.4   --            Hemodynamics:  Temp (24hrs), Av.9 °C (98.5 °F), Min:36.6 °C (97.8 °F), Max:37.4 °C (99.3 °F)  Temperature: 37.1 °C (98.7 °F)  Pulse  Av.3  Min: 83  Max: 107Heart Rate (Monitored): 91  Blood Pressure : 139/81 mmHg, NIBP: 116/68 mmHg     Respiratory:    Respiration: 19, Pulse Oximetry: 96 %           Fluids:    Intake/Output Summary (Last 24 hours) at 17 9734  Last data  filed at 03/21/17 1739   Gross per 24 hour   Intake   1800 ml   Output    540 ml   Net   1260 ml     Weight: 75.7 kg (166 lb 14.2 oz)  GI/Nutrition:  Orders Placed This Encounter   Procedures   • DIET ORDER     Standing Status: Standing      Number of Occurrences: 1      Standing Expiration Date:      Order Specific Question:  Diet:     Answer:  Regular [1]     Order Specific Question:  Miscellaneous modifications:     Answer:  No Decaf, No Caffeine(for test) [11]      Comments:  Protocol 1313 Patient to have no caffeine for 12 hours prior to exam (decaf, coffee, cola, tea, chocolate)     Medical Decision Making, by Problem:  Active Hospital Problems    Diagnosis   • *DELAROSA (dyspnea on exertion) [R06.09]  Likely from underlying cardiomyopathy  Appears euvolumic clinically     • Cardiomyopathy (CMS-HCC) [I42.9]  ECHO LVEF 30%-New diagnosis  MPI pending  Cardiology Dr.Letita Price  Started on asa, Lipitor 40, lisinopril 5 mg, Carvedilol 3.125 mg  ? Possible etoh related  On iv thiamine   Counseled etoh cessation  Utox neg     • Near syncope [R55]pt is a poor historian'  Follow on tele  ECHO LVEF 30%     • JUAN CARLOS (acute kidney injury) (CMS-[facility]) [N17.9]  JUAN CARLOS on CKD-3  Creatinine improved  Avoid nephrotoxins  Renal U/S negative     • Iron deficiency anemia [D50.9]  Per iron studies  Started on iron supplements  Will need op pcp follow, possible colonoscopy later as op     • Alcohol dependence (CMS-HCC) [F10.20]counseled cessation  On iv thiamine, mvi, folate, prn ativan         Labs reviewed, EKG reviewed, Medications reviewed and Radiology images reviewed  De Luna catheter: No De Luna      DVT Prophylaxis: Heparin    Ulcer prophylaxis: Yes    Assessed for rehab: Patient returned to prior level of function, rehabilitation not indicated at this time

## 2017-03-22 NOTE — CARE PLAN
Problem: Safety  Goal: Will remain free from falls  Intervention: Implement fall precautions  Pt is on bed alarm. Pt has personal belongings, wastebasket, call light with in reach. Bed in lowest position.       Problem: Knowledge Deficit  Goal: Knowledge of the prescribed therapeutic regimen will improve  Intervention: Discuss information regarding therpeutic regimen and document in education  Pt educated regarding importance of medications.

## 2017-03-22 NOTE — PROGRESS NOTES
Received bed side report from AM nurse. Patient is A&Ox4.  Patient denies pain. Patient is resting in bed. Bed alarm is on and call light within reach. Will continue to monitor.

## 2017-03-23 LAB
ALBUMIN SERPL BCP-MCNC: 3.5 G/DL (ref 3.2–4.9)
ALBUMIN/GLOB SERPL: 1 G/DL
ALP SERPL-CCNC: 43 U/L (ref 30–99)
ALT SERPL-CCNC: 7 U/L (ref 2–50)
ANION GAP SERPL CALC-SCNC: 4 MMOL/L (ref 0–11.9)
ANISOCYTOSIS BLD QL SMEAR: ABNORMAL
AST SERPL-CCNC: 14 U/L (ref 12–45)
BASOPHILS # BLD AUTO: 0.9 % (ref 0–1.8)
BASOPHILS # BLD: 0.04 K/UL (ref 0–0.12)
BILIRUB SERPL-MCNC: 0.2 MG/DL (ref 0.1–1.5)
BUN SERPL-MCNC: 12 MG/DL (ref 8–22)
CALCIUM SERPL-MCNC: 8.8 MG/DL (ref 8.5–10.5)
CHLORIDE SERPL-SCNC: 103 MMOL/L (ref 96–112)
CO2 SERPL-SCNC: 26 MMOL/L (ref 20–33)
CREAT SERPL-MCNC: 1.46 MG/DL (ref 0.5–1.4)
EOSINOPHIL # BLD AUTO: 0.07 K/UL (ref 0–0.51)
EOSINOPHIL NFR BLD: 1.7 % (ref 0–6.9)
ERYTHROCYTE [DISTWIDTH] IN BLOOD BY AUTOMATED COUNT: 51.8 FL (ref 35.9–50)
GFR SERPL CREATININE-BSD FRML MDRD: 48 ML/MIN/1.73 M 2
GLOBULIN SER CALC-MCNC: 3.4 G/DL (ref 1.9–3.5)
GLUCOSE SERPL-MCNC: 98 MG/DL (ref 65–99)
HCT VFR BLD AUTO: 35.1 % (ref 42–52)
HGB BLD-MCNC: 11.2 G/DL (ref 14–18)
LV EJECT FRACT  99904: 20
LV EJECT FRACT MOD 2C 99903: 46.79
LV EJECT FRACT MOD 4C 99902: 38.69
LV EJECT FRACT MOD BP 99901: 43.97
LYMPHOCYTES # BLD AUTO: 1.18 K/UL (ref 1–4.8)
LYMPHOCYTES NFR BLD: 28.1 % (ref 22–41)
MANUAL DIFF BLD: NORMAL
MCH RBC QN AUTO: 26.4 PG (ref 27–33)
MCHC RBC AUTO-ENTMCNC: 31.9 G/DL (ref 33.7–35.3)
MCV RBC AUTO: 82.6 FL (ref 81.4–97.8)
MICROCYTES BLD QL SMEAR: ABNORMAL
MONOCYTES # BLD AUTO: 0.7 K/UL (ref 0–0.85)
MONOCYTES NFR BLD AUTO: 16.7 % (ref 0–13.4)
MORPHOLOGY BLD-IMP: NORMAL
NEUTROPHILS # BLD AUTO: 2.21 K/UL (ref 1.82–7.42)
NEUTROPHILS NFR BLD: 52.6 % (ref 44–72)
NRBC # BLD AUTO: 0 K/UL
NRBC BLD AUTO-RTO: 0 /100 WBC
PLATELET # BLD AUTO: 204 K/UL (ref 164–446)
PLATELET BLD QL SMEAR: NORMAL
PMV BLD AUTO: 10.2 FL (ref 9–12.9)
POIKILOCYTOSIS BLD QL SMEAR: NORMAL
POTASSIUM SERPL-SCNC: 3.8 MMOL/L (ref 3.6–5.5)
PROT SERPL-MCNC: 6.9 G/DL (ref 6–8.2)
RBC # BLD AUTO: 4.25 M/UL (ref 4.7–6.1)
RBC BLD AUTO: PRESENT
SODIUM SERPL-SCNC: 133 MMOL/L (ref 135–145)
WBC # BLD AUTO: 4.2 K/UL (ref 4.8–10.8)

## 2017-03-23 PROCEDURE — 80053 COMPREHEN METABOLIC PANEL: CPT

## 2017-03-23 PROCEDURE — 85027 COMPLETE CBC AUTOMATED: CPT

## 2017-03-23 PROCEDURE — 36415 COLL VENOUS BLD VENIPUNCTURE: CPT

## 2017-03-23 PROCEDURE — G0378 HOSPITAL OBSERVATION PER HR: HCPCS

## 2017-03-23 PROCEDURE — A9270 NON-COVERED ITEM OR SERVICE: HCPCS | Performed by: NURSE PRACTITIONER

## 2017-03-23 PROCEDURE — 85007 BL SMEAR W/DIFF WBC COUNT: CPT

## 2017-03-23 PROCEDURE — 700102 HCHG RX REV CODE 250 W/ 637 OVERRIDE(OP): Performed by: NURSE PRACTITIONER

## 2017-03-23 PROCEDURE — 700105 HCHG RX REV CODE 258: Performed by: INTERNAL MEDICINE

## 2017-03-23 PROCEDURE — 700102 HCHG RX REV CODE 250 W/ 637 OVERRIDE(OP): Performed by: INTERNAL MEDICINE

## 2017-03-23 PROCEDURE — 700111 HCHG RX REV CODE 636 W/ 250 OVERRIDE (IP): Performed by: INTERNAL MEDICINE

## 2017-03-23 PROCEDURE — 99226 PR SUBSEQUENT OBSERVATION CARE,LEVEL III: CPT | Performed by: INTERNAL MEDICINE

## 2017-03-23 PROCEDURE — A9270 NON-COVERED ITEM OR SERVICE: HCPCS | Performed by: INTERNAL MEDICINE

## 2017-03-23 PROCEDURE — 700111 HCHG RX REV CODE 636 W/ 250 OVERRIDE (IP): Performed by: HOSPITALIST

## 2017-03-23 PROCEDURE — 84145 PROCALCITONIN (PCT): CPT

## 2017-03-23 PROCEDURE — 93308 TTE F-UP OR LMTD: CPT | Mod: 26 | Performed by: INTERNAL MEDICINE

## 2017-03-23 PROCEDURE — 93308 TTE F-UP OR LMTD: CPT

## 2017-03-23 RX ORDER — OSELTAMIVIR PHOSPHATE 30 MG/1
30 CAPSULE ORAL EVERY 12 HOURS
Status: DISCONTINUED | OUTPATIENT
Start: 2017-03-23 | End: 2017-03-24 | Stop reason: HOSPADM

## 2017-03-23 RX ORDER — FLUTICASONE PROPIONATE 50 MCG
2 SPRAY, SUSPENSION (ML) NASAL DAILY
Status: DISCONTINUED | OUTPATIENT
Start: 2017-03-23 | End: 2017-03-24 | Stop reason: HOSPADM

## 2017-03-23 RX ORDER — CARVEDILOL 12.5 MG/1
12.5 TABLET ORAL 2 TIMES DAILY WITH MEALS
Status: DISCONTINUED | OUTPATIENT
Start: 2017-03-23 | End: 2017-03-24 | Stop reason: HOSPADM

## 2017-03-23 RX ORDER — CETIRIZINE HYDROCHLORIDE 10 MG/1
10 TABLET ORAL DAILY
Status: DISCONTINUED | OUTPATIENT
Start: 2017-03-23 | End: 2017-03-24 | Stop reason: HOSPADM

## 2017-03-23 RX ADMIN — GUAIFENESIN 100 MG: 100 SOLUTION ORAL at 16:36

## 2017-03-23 RX ADMIN — OMEPRAZOLE 20 MG: 20 CAPSULE, DELAYED RELEASE ORAL at 08:44

## 2017-03-23 RX ADMIN — OSELTAMIVIR PHOSPHATE 30 MG: 30 CAPSULE ORAL at 20:07

## 2017-03-23 RX ADMIN — SPIRONOLACTONE 12.5 MG: 25 TABLET, FILM COATED ORAL at 08:43

## 2017-03-23 RX ADMIN — CETIRIZINE HYDROCHLORIDE 10 MG: 10 TABLET, FILM COATED ORAL at 16:36

## 2017-03-23 RX ADMIN — THIAMINE HYDROCHLORIDE 100 MG: 100 INJECTION, SOLUTION INTRAMUSCULAR; INTRAVENOUS at 12:45

## 2017-03-23 RX ADMIN — ATORVASTATIN CALCIUM 40 MG: 40 TABLET, FILM COATED ORAL at 20:07

## 2017-03-23 RX ADMIN — ASPIRIN 81 MG: 81 TABLET, CHEWABLE ORAL at 08:44

## 2017-03-23 RX ADMIN — CARVEDILOL 6.25 MG: 6.25 TABLET, FILM COATED ORAL at 08:42

## 2017-03-23 RX ADMIN — HEPARIN SODIUM 5000 UNITS: 5000 INJECTION, SOLUTION INTRAVENOUS; SUBCUTANEOUS at 20:07

## 2017-03-23 RX ADMIN — FERROUS GLUCONATE 324 MG: 324 TABLET ORAL at 08:43

## 2017-03-23 RX ADMIN — THERA TABS 1 TABLET: TAB at 08:43

## 2017-03-23 RX ADMIN — OSELTAMIVIR PHOSPHATE 30 MG: 30 CAPSULE ORAL at 08:44

## 2017-03-23 RX ADMIN — HEPARIN SODIUM 5000 UNITS: 5000 INJECTION, SOLUTION INTRAVENOUS; SUBCUTANEOUS at 05:52

## 2017-03-23 RX ADMIN — CEFTRIAXONE 2 G: 2 INJECTION, POWDER, FOR SOLUTION INTRAMUSCULAR; INTRAVENOUS at 11:42

## 2017-03-23 RX ADMIN — LISINOPRIL 2.5 MG: 5 TABLET ORAL at 08:41

## 2017-03-23 RX ADMIN — CARVEDILOL 12.5 MG: 12.5 TABLET, FILM COATED ORAL at 16:36

## 2017-03-23 RX ADMIN — ROSUVASTATIN CALCIUM 250 MCG: 10 TABLET, FILM COATED ORAL at 08:42

## 2017-03-23 RX ADMIN — FOLIC ACID 1 MG: 1 TABLET ORAL at 08:43

## 2017-03-23 ASSESSMENT — ENCOUNTER SYMPTOMS
NERVOUS/ANXIOUS: 1
COUGH: 0
PND: 0
NAUSEA: 0
SORE THROAT: 0
CONSTIPATION: 0
ORTHOPNEA: 0
BACK PAIN: 1
MYALGIAS: 0
DIAPHORESIS: 0
PALPITATIONS: 0
WEAKNESS: 1
BLOOD IN STOOL: 0
VOMITING: 0
ABDOMINAL PAIN: 0
FOCAL WEAKNESS: 0
SHORTNESS OF BREATH: 0
DIZZINESS: 0
WHEEZING: 0
FEVER: 0
SENSORY CHANGE: 0
CHILLS: 0
DIARRHEA: 0
HEADACHES: 0
SPEECH CHANGE: 0
CLAUDICATION: 0
SPUTUM PRODUCTION: 0

## 2017-03-23 ASSESSMENT — COPD QUESTIONNAIRES
COPD SCREENING SCORE: 4
HAVE YOU SMOKED AT LEAST 100 CIGARETTES IN YOUR ENTIRE LIFE: YES
DURING THE PAST 4 WEEKS HOW MUCH DID YOU FEEL SHORT OF BREATH: NONE/LITTLE OF THE TIME
DO YOU EVER COUGH UP ANY MUCUS OR PHLEGM?: NO/ONLY WITH OCCASIONAL COLDS OR INFECTIONS

## 2017-03-23 ASSESSMENT — LIFESTYLE VARIABLES
EVER_SMOKED: YES
SUBSTANCE_ABUSE: 1

## 2017-03-23 ASSESSMENT — PAIN SCALES - GENERAL
PAINLEVEL_OUTOF10: 0

## 2017-03-23 NOTE — PROGRESS NOTES
Patient resting comfortably in bed, denies any pain at this time. Requesting cough medicine. MD notified and entering orders

## 2017-03-23 NOTE — CARE PLAN
Problem: Safety  Goal: Will remain free from injury  Outcome: PROGRESSING AS EXPECTED  Patient educated on safety and fall risk  Is refusing bed alarm at this time.   Patient ambulating steadily independently     Problem: Venous Thromboembolism (VTW)/Deep Vein Thrombosis (DVT) Prevention:  Goal: Patient will participate in Venous Thrombosis (VTE)/Deep Vein Thrombosis (DVT)Prevention Measures  Outcome: PROGRESSING AS EXPECTED  SQ Heparin tid, patient ambulating     Problem: Hemodynamic Status  Goal: Stable Vital Signs and Fluid Balance  Outcome: PROGRESSING AS EXPECTED  Vitals stable, tele on patient and rhythm NS-ST with a BBB. ECG in chart

## 2017-03-23 NOTE — HEART FAILURE PROGRAM
Cardiovascular Nurse Navigator () Progress Note:     Consult RN Navigator order received. Per hospitalist RN, pt is feeling very unwell today. Will attempt to see patient tomorrow morning.

## 2017-03-23 NOTE — PROGRESS NOTES
Sherri from Lab called with positive Influenza A results. Result read back to Sherri.   Dr. Chandler paged. Ramesh aware and no new orders at this time.

## 2017-03-23 NOTE — PROGRESS NOTES
Pt resting comfortably in bed. Denies any needs at this time. Bed alarm on. Bed locked and in lowest position. Call light within reach.

## 2017-03-23 NOTE — PROGRESS NOTES
Received bedside report. Assumed care of patient. Patient A&Ox4, included in plan of care. Patient reports no pain, only cough. Patient refusing bed alarm at this time. Patient educated on safety and fall risk.   Call light within reach, bed locked and at lowest position, call light within reach.

## 2017-03-23 NOTE — PROGRESS NOTES
Pt refuses bed alarm. Two RN's educated pt on fall risk. Pt verbalizes understanding and continues to refuse.

## 2017-03-23 NOTE — PROGRESS NOTES
Hospital Medicine Progress Note, Adult, Complex               Author: Chrissy Prescott Date & Time created: 3/23/2017  7:00 AM     ID/CC: 66 M with c/o DELAROSA found to have Cardiomyopathy -LVEF 30%    Interval History:  Pt c/o feeling weak and tired, denied chest pain or sob.  Fever resolved.  + ve Influenza A  Started on Tamiflu  D/cd IV C3  Creatinine trending up.Follow BMP at am  Repeat ECHO LVEF 20%- likely ?ETOH related, counseled alcohol cessation. Discussed with cardiology, optimize cardiac meds, ETOH cessation and follow in clinic.    Review of Systems:  Review of Systems   Constitutional: Positive for malaise/fatigue. Negative for fever, chills and diaphoresis.   HENT: Negative for congestion and sore throat.    Respiratory: Negative for cough, sputum production, shortness of breath and wheezing.    Cardiovascular: Negative for chest pain, palpitations, orthopnea and leg swelling.   Gastrointestinal: Negative for nausea, vomiting, abdominal pain, diarrhea, constipation, blood in stool and melena.   Genitourinary: Negative for dysuria and urgency.   Musculoskeletal: Positive for back pain. Negative for myalgias.   Neurological: Positive for weakness. Negative for dizziness, sensory change, speech change, focal weakness and headaches.   Psychiatric/Behavioral: Positive for substance abuse. The patient is nervous/anxious.        Physical Exam:  Physical Exam   Constitutional: He is oriented to person, place, and time. No distress.   HENT:   Head: Normocephalic and atraumatic.   Eyes: EOM are normal. Right eye exhibits no discharge. Left eye exhibits no discharge. No scleral icterus.   Neck: Neck supple. No JVD present.   Cardiovascular: Normal rate and regular rhythm.    No murmur heard.  Pulmonary/Chest: Effort normal and breath sounds normal. No stridor. No respiratory distress. He has no wheezes. He has no rales.   Abdominal: Soft. Bowel sounds are normal. He exhibits no distension. There is no tenderness.    Musculoskeletal: He exhibits no edema or tenderness.   Neurological: He is oriented to person, place, and time.   Sleepy    Skin: Skin is warm and dry. He is not diaphoretic.   Psychiatric:   Irritable    Nursing note and vitals reviewed.      Labs:        Invalid input(s): [id number]  Recent Labs      03/20/17   1008  03/20/17   1748  03/21/17   1200   TROPONINI  0.03  0.02   --    BNPBTYPENAT  95   --   245*     Recent Labs      03/21/17   0103  03/21/17   1200  03/22/17   0807  03/23/17   0246   SODIUM  138   --   135  133*   POTASSIUM  3.9   --   4.7  3.8   CHLORIDE  108   --   104  103   CO2  20   --   24  26   BUN  12   --   8  12   CREATININE  1.31   --   1.43*  1.46*   PHOSPHORUS  2.8  2.4*   --    --    CALCIUM  8.5   --   9.3  8.8     Recent Labs      03/20/17   1008  03/21/17 0103 03/22/17   0807 03/23/17   0246   ALTSGPT  11   --    --   7   ASTSGOT  18   --    --   14   ALKPHOSPHAT  51   --    --   43   TBILIRUBIN  0.4   --    --   0.2   LIPASE  59   --    --    --    GLUCOSE  94  90  106*  98     Recent Labs      03/20/17   1008  03/21/17 0103 03/21/17   1200  03/22/17   0807  03/23/17   0246   RBC  4.28*  4.17*   --   4.48*  4.25*   HEMOGLOBIN  11.5*  10.8*   --   11.8*  11.2*   HEMATOCRIT  36.3*  34.6*   --   37.8*  35.1*   PLATELETCT  216  206   --   223  204   PROTHROMBTM  13.0   --    --    --    --    APTT  26.2   --    --    --    --    INR  0.95   --    --    --    --    IRON   --    --   24*   --    --    FERRITIN   --    --   81.0   --    --    TOTIRONBC   --    --   358   --    --      Recent Labs      03/20/17   1008  03/21/17 0103 03/22/17   0807 03/23/17   0246   WBC  4.6*  5.7  4.9  4.2*   NEUTSPOLYS  36.30*  47.20   --   52.60   LYMPHOCYTES  32.10  31.20   --   28.10   MONOCYTES  25.80*  16.40*   --   16.70*   EOSINOPHILS  4.40  4.50   --   1.70   BASOPHILS  0.70  0.50   --   0.90   ASTSGOT  18   --    --   14   ALTSGPT  11   --    --   7   ALKPHOSPHAT  51   --    --    43   TBILIRUBIN  0.4   --    --   0.2           Hemodynamics:  Temp (24hrs), Av.3 °C (99.1 °F), Min:36.8 °C (98.2 °F), Max:38.5 °C (101.3 °F)  Temperature: 37.1 °C (98.8 °F)  Pulse  Av.2  Min: 77  Max: 107   Blood Pressure : 123/78 mmHg     Respiratory:    Respiration: 16, Pulse Oximetry: 100 %           Fluids:    Intake/Output Summary (Last 24 hours) at 17 0700  Last data filed at 17 2200   Gross per 24 hour   Intake    720 ml   Output      0 ml   Net    720 ml     Weight: 77.8 kg (171 lb 8.3 oz)  GI/Nutrition:  Orders Placed This Encounter   Procedures   • DIET ORDER     Standing Status: Standing      Number of Occurrences: 1      Standing Expiration Date:      Order Specific Question:  Diet:     Answer:  Regular [1]     Order Specific Question:  Miscellaneous modifications:     Answer:  No Decaf, No Caffeine(for test) [11]      Comments:  Protocol 1313 Patient to have no caffeine for 12 hours prior to exam (decaf, coffee, cola, tea, chocolate)     Medical Decision Making, by Problem:  Active Hospital Problems    Diagnosis   • *DELAROSA (dyspnea on exertion) [R06.09]resolved   Likely from underlying cardiomyopathy  Appears euvolumic clinically     • Cardiomyopathy (CMS-HCC) [I42.9]  ECHO LVEF 30%-New diagnosis, --ECHO repeat 3/23 LVEF 20% global hypokinesis (cards aware)  MPI abnormal-card aware  Cardiology Dr.Letita Price  Started on asa, Lipitor 40, lisinopril 2.5 mg, Carvedilol dose increased to 12.5 bid mg, spironolactone 12.5 daily    ? Possible etoh related  Treated with thiamine   Counseled etoh cessation  Utox neg     • Near syncope [R55]pt is a poor historian'  Follow on tele  ECHO LVEF 30%  Repeat ECHO tomorrow when HR controlled      • JUAN CARLOS (acute kidney injury) (CMS-HCC) [N17.9]  JUAN CARLOS on CKD-3  Creatinine trending up   Avoid nephrotoxins  Renal U/S negative     • Iron deficiency anemia [D50.9]  Hb stable >11  Per iron studies  Started on iron supplements  Will need op pcp follow,  possible colonoscopy later as op     • Alcohol dependence (CMS-HCC) [F10.20]counseled cessation  No withdrawal  On thiamine, mvi, folate, prn ativan         Labs reviewed, EKG reviewed, Medications reviewed and Radiology images reviewed  De Luna catheter: No De Luna      DVT Prophylaxis: Heparin    Ulcer prophylaxis: Yes    Assessed for rehab: Patient returned to prior level of function, rehabilitation not indicated at this time

## 2017-03-23 NOTE — PROGRESS NOTES
"Cardiology Progress Note               Author: Fanny Maldonado Date & Time created: 3/23/2017  9:49 AM     Interval History:        Consultation for chest discomfort, palpitation, left bundle branch        Admitted with palpitations, dyspnea on exertion, chronic back pain      History of hyperlipidemia    Labs reviewed  NA = 133  K = 3.8  CR = 1.46  Negative urine drug screen  BNP = 245  Troponin negative  WBC=4.2, no fever    BP = 115/57  HR = 76 NSR with LBBB        Echocardiogram 3/20/17 showed LVEF 30%, no significant valvular disease    MPI 3/21/17 showed akinetic septal wall, question reversible defect (elevated heart rate ), defect may be artifact from left bundle, steady undiagnostic  Review of Systems   Constitutional:        \" overall feeling better \"  NO chest pain   NO palpitations  NO fever   Respiratory: Negative for cough and shortness of breath.    Cardiovascular: Negative for chest pain, palpitations, orthopnea, claudication, leg swelling and PND.       Physical Exam   Constitutional: He is oriented to person, place, and time. He appears well-developed.   HENT:   Head: Normocephalic.   Eyes: Conjunctivae are normal.   Neck: No JVD present. No thyromegaly present.   Cardiovascular: Normal rate and regular rhythm.    Pulses:       Carotid pulses are 2+ on the right side, and 2+ on the left side.       Radial pulses are 2+ on the right side, and 2+ on the left side.        Posterior tibial pulses are 2+ on the right side, and 2+ on the left side.   Pulmonary/Chest: He has no wheezes.   Abdominal: Soft.   Musculoskeletal: He exhibits no edema.   Neurological: He is alert and oriented to person, place, and time.   Skin: Skin is warm and dry.       Hemodynamics:  Temp (24hrs), Av.2 °C (99 °F), Min:36.5 °C (97.7 °F), Max:38.5 °C (101.3 °F)  Temperature: 36.5 °C (97.7 °F)  Pulse  Av.7  Min: 76  Max: 107   Blood Pressure : 115/57 mmHg     Respiratory:    Respiration: 18, Pulse Oximetry: 97 %         "   Fluids:     Weight: 77.8 kg (171 lb 8.3 oz)  GI/Nutrition:  Orders Placed This Encounter   Procedures   • DIET ORDER     Standing Status: Standing      Number of Occurrences: 1      Standing Expiration Date:      Order Specific Question:  Diet:     Answer:  Regular [1]     Order Specific Question:  Miscellaneous modifications:     Answer:  No Decaf, No Caffeine(for test) [11]      Comments:  Protocol 1313 Patient to have no caffeine for 12 hours prior to exam (decaf, coffee, cola, tea, chocolate)     Lab Results:  Recent Labs      03/21/17   0103  03/22/17   0807  03/23/17   0246   WBC  5.7  4.9  4.2*   RBC  4.17*  4.48*  4.25*   HEMOGLOBIN  10.8*  11.8*  11.2*   HEMATOCRIT  34.6*  37.8*  35.1*   MCV  83.0  84.4  82.6   MCH  25.9*  26.3*  26.4*   MCHC  31.2*  31.2*  31.9*   RDW  52.3*  53.1*  51.8*   PLATELETCT  206  223  204   MPV  10.7  10.4  10.2     Recent Labs      03/21/17   0103  03/22/17   0807  03/23/17   0246   SODIUM  138  135  133*   POTASSIUM  3.9  4.7  3.8   CHLORIDE  108  104  103   CO2  20  24  26   GLUCOSE  90  106*  98   BUN  12  8  12   CREATININE  1.31  1.43*  1.46*   CALCIUM  8.5  9.3  8.8     Recent Labs      03/20/17   1008   APTT  26.2   INR  0.95     Recent Labs      03/20/17   1008  03/21/17   1200   BNPBTYPENAT  95  245*     Recent Labs      03/20/17   1008  03/20/17   1748  03/21/17   1200   TROPONINI  0.03  0.02   --    BNPBTYPENAT  95   --   245*     Recent Labs      03/21/17   0103   TRIGLYCERIDE  99   HDL  42   LDL  133*         Medical Decision Making, by Problem:  Active Hospital Problems    Diagnosis   • *DELAROSA (dyspnea on exertion) [R06.09]   • Cardiomyopathy (CMS-HCC) [I42.9]   • Near syncope [R55]   • JUAN CARLOS (acute kidney injury) (CMS-HCC) [N17.9]   • Iron deficiency anemia [D50.9]   • Alcohol dependence (CMS-HCC) [F10.20]       Plan:   Positive influenza A  ON Tamiflu  On Rocephin  Limited echo pending  MPI (3/21/17) undiagnostic secondary to elevated heart rate ( artifact from  LBBB as well )  Echocardiogram (3/20/17) showed cardiomyopathy with EF 30% (question alcohol-related), no HF symptoms  Currently on aspirin, Lipitor 40, carvedilol 6.25, digoxin 250 MCG, lisinopril 2.5, spironolactone 12.5  NO overnight issues with rhythm, NSR  Cr=1.46  I/O =- no UO recorded  Follow-up appointment was scheduled with heart failure clinic on 3/30/17 at 745  Medications reviewed and Labs reviewed

## 2017-03-24 VITALS
TEMPERATURE: 97.3 F | WEIGHT: 169.31 LBS | RESPIRATION RATE: 16 BRPM | BODY MASS INDEX: 21.73 KG/M2 | OXYGEN SATURATION: 96 % | HEIGHT: 74 IN | SYSTOLIC BLOOD PRESSURE: 113 MMHG | HEART RATE: 75 BPM | DIASTOLIC BLOOD PRESSURE: 72 MMHG

## 2017-03-24 LAB
ANION GAP SERPL CALC-SCNC: 9 MMOL/L (ref 0–11.9)
BUN SERPL-MCNC: 10 MG/DL (ref 8–22)
CALCIUM SERPL-MCNC: 9.2 MG/DL (ref 8.5–10.5)
CHLORIDE SERPL-SCNC: 104 MMOL/L (ref 96–112)
CO2 SERPL-SCNC: 23 MMOL/L (ref 20–33)
CREAT SERPL-MCNC: 1.39 MG/DL (ref 0.5–1.4)
GFR SERPL CREATININE-BSD FRML MDRD: 51 ML/MIN/1.73 M 2
GLUCOSE SERPL-MCNC: 128 MG/DL (ref 65–99)
POTASSIUM SERPL-SCNC: 4.1 MMOL/L (ref 3.6–5.5)
PROCALCITONIN SERPL-MCNC: 0.11 NG/ML
SODIUM SERPL-SCNC: 136 MMOL/L (ref 135–145)

## 2017-03-24 PROCEDURE — G0378 HOSPITAL OBSERVATION PER HR: HCPCS

## 2017-03-24 PROCEDURE — 80048 BASIC METABOLIC PNL TOTAL CA: CPT

## 2017-03-24 PROCEDURE — 36415 COLL VENOUS BLD VENIPUNCTURE: CPT

## 2017-03-24 PROCEDURE — 700102 HCHG RX REV CODE 250 W/ 637 OVERRIDE(OP): Performed by: INTERNAL MEDICINE

## 2017-03-24 PROCEDURE — 700111 HCHG RX REV CODE 636 W/ 250 OVERRIDE (IP): Performed by: HOSPITALIST

## 2017-03-24 PROCEDURE — A9270 NON-COVERED ITEM OR SERVICE: HCPCS | Performed by: INTERNAL MEDICINE

## 2017-03-24 PROCEDURE — 99217 PR OBSERVATION CARE DISCHARGE: CPT | Performed by: INTERNAL MEDICINE

## 2017-03-24 RX ORDER — CARVEDILOL 12.5 MG/1
12.5 TABLET ORAL 2 TIMES DAILY WITH MEALS
Qty: 60 TAB | Refills: 1 | Status: SHIPPED | OUTPATIENT
Start: 2017-03-24 | End: 2017-04-18 | Stop reason: SDUPTHER

## 2017-03-24 RX ORDER — ATORVASTATIN CALCIUM 40 MG/1
40 TABLET, FILM COATED ORAL
Qty: 30 TAB | Refills: 1 | Status: SHIPPED | OUTPATIENT
Start: 2017-03-24 | End: 2017-04-18 | Stop reason: SDUPTHER

## 2017-03-24 RX ORDER — ATORVASTATIN CALCIUM 40 MG/1
40 TABLET, FILM COATED ORAL
Qty: 30 TAB | Refills: 1 | Status: SHIPPED | OUTPATIENT
Start: 2017-03-24 | End: 2017-03-24

## 2017-03-24 RX ORDER — CARVEDILOL 12.5 MG/1
12.5 TABLET ORAL 2 TIMES DAILY WITH MEALS
Qty: 60 TAB | Refills: 1 | Status: SHIPPED | OUTPATIENT
Start: 2017-03-24 | End: 2017-03-24

## 2017-03-24 RX ORDER — SPIRONOLACTONE 25 MG/1
12.5 TABLET ORAL DAILY
Qty: 30 TAB | Refills: 1 | Status: SHIPPED | OUTPATIENT
Start: 2017-03-24 | End: 2017-04-18 | Stop reason: SDUPTHER

## 2017-03-24 RX ORDER — SPIRONOLACTONE 25 MG/1
12.5 TABLET ORAL DAILY
Qty: 30 TAB | Refills: 1 | Status: SHIPPED | OUTPATIENT
Start: 2017-03-24 | End: 2017-03-24

## 2017-03-24 RX ORDER — FLUTICASONE PROPIONATE 50 MCG
2 SPRAY, SUSPENSION (ML) NASAL DAILY
Qty: 16 G | Refills: 1 | Status: SHIPPED | OUTPATIENT
Start: 2017-03-24

## 2017-03-24 RX ORDER — OSELTAMIVIR PHOSPHATE 30 MG/1
30 CAPSULE ORAL EVERY 12 HOURS
Qty: 6 CAP | Refills: 0 | Status: SHIPPED | OUTPATIENT
Start: 2017-03-24 | End: 2017-03-27

## 2017-03-24 RX ORDER — FERROUS GLUCONATE 324(38)MG
324 TABLET ORAL
Qty: 30 TAB | Refills: 1 | Status: SHIPPED | OUTPATIENT
Start: 2017-03-24 | End: 2017-03-24

## 2017-03-24 RX ORDER — LISINOPRIL 5 MG/1
5 TABLET ORAL DAILY
Qty: 60 TAB | Refills: 1 | Status: SHIPPED | OUTPATIENT
Start: 2017-03-24 | End: 2017-04-18 | Stop reason: SDUPTHER

## 2017-03-24 RX ORDER — ASPIRIN 81 MG/1
81 TABLET, CHEWABLE ORAL DAILY
Qty: 100 TAB | Refills: 1 | Status: SHIPPED | OUTPATIENT
Start: 2017-03-24

## 2017-03-24 RX ORDER — FERROUS GLUCONATE 324(38)MG
324 TABLET ORAL
Qty: 30 TAB | Refills: 1 | Status: SHIPPED | OUTPATIENT
Start: 2017-03-24

## 2017-03-24 RX ORDER — OSELTAMIVIR PHOSPHATE 30 MG/1
30 CAPSULE ORAL EVERY 12 HOURS
Qty: 6 CAP | Refills: 0 | Status: SHIPPED | OUTPATIENT
Start: 2017-03-24 | End: 2017-03-24

## 2017-03-24 RX ORDER — FLUTICASONE PROPIONATE 50 MCG
2 SPRAY, SUSPENSION (ML) NASAL DAILY
Qty: 16 G | Refills: 1 | Status: SHIPPED | OUTPATIENT
Start: 2017-03-24 | End: 2017-03-24

## 2017-03-24 RX ORDER — ATORVASTATIN CALCIUM 40 MG/1
40 TABLET, FILM COATED ORAL
Qty: 30 TAB | Refills: 2 | Status: SHIPPED | OUTPATIENT
Start: 2017-03-24 | End: 2017-03-24

## 2017-03-24 RX ADMIN — CETIRIZINE HYDROCHLORIDE 10 MG: 10 TABLET, FILM COATED ORAL at 09:25

## 2017-03-24 RX ADMIN — HEPARIN SODIUM 5000 UNITS: 5000 INJECTION, SOLUTION INTRAVENOUS; SUBCUTANEOUS at 05:19

## 2017-03-24 RX ADMIN — LISINOPRIL 2.5 MG: 5 TABLET ORAL at 09:24

## 2017-03-24 RX ADMIN — OSELTAMIVIR PHOSPHATE 30 MG: 30 CAPSULE ORAL at 09:24

## 2017-03-24 RX ADMIN — OMEPRAZOLE 20 MG: 20 CAPSULE, DELAYED RELEASE ORAL at 09:25

## 2017-03-24 RX ADMIN — FERROUS GLUCONATE 324 MG: 324 TABLET ORAL at 09:25

## 2017-03-24 RX ADMIN — SPIRONOLACTONE 12.5 MG: 25 TABLET, FILM COATED ORAL at 09:24

## 2017-03-24 RX ADMIN — ROSUVASTATIN CALCIUM 250 MCG: 10 TABLET, FILM COATED ORAL at 09:25

## 2017-03-24 RX ADMIN — CARVEDILOL 12.5 MG: 12.5 TABLET, FILM COATED ORAL at 09:25

## 2017-03-24 RX ADMIN — ASPIRIN 81 MG: 81 TABLET, CHEWABLE ORAL at 09:24

## 2017-03-24 RX ADMIN — THERA TABS 1 TABLET: TAB at 09:25

## 2017-03-24 RX ADMIN — FOLIC ACID 1 MG: 1 TABLET ORAL at 09:25

## 2017-03-24 ASSESSMENT — PAIN SCALES - GENERAL
PAINLEVEL_OUTOF10: 0
PAINLEVEL_OUTOF10: 0

## 2017-03-24 ASSESSMENT — LIFESTYLE VARIABLES: EVER_SMOKED: YES

## 2017-03-24 NOTE — DISCHARGE SUMMARY
DISCHARGE SUMMARY -[id number]    ADMIT DATE:  3/20/2017         DISCHARGE DATE: 3/24/17    PATIENT ID:  Name: Seth Olivera     YOB: 1950  Age: 66 y.o. male   MRN: [id number]  Address: [address] [place] [address]  Phone: [phone] (home)    DATE OF ADMISSION:  03/20/2017    DATE OF DISCHARGE:  03/24/2017    DISCHARGE DIAGNOSES:    1.  Cardiomyopathy, possibly nonischemic from alcoholism with ejection   fraction of 20% (new diagnosis).    2.  Near syncope, likely cardiogenic.    3.  Acute kidney injury on chronic kidney disease stage III.    4.  Iron deficiency anemia.    5.  Ongoing alcohol dependence.    6.  Influenza pneumonia.      HISTORY OF PRESENT ILLNESS AND HOSPITAL COURSE:  Please refer to HPI dictated   by Dr. Shreyas Pereyra on 03/20/2017 for complete details.  In brief, the patient   is a 66-year-old male who presented to the emergency room with complaints of   dyspnea on exertion.  The patient has been having more exertional dyspnea with   walking and having sex as well.  Patient used to drink a lot of alcohol.  He   denied recreational drug use or smoking currently; however, he said that he   has been drinking alcohol more than usual.  Patient was suspected to have   congestive heart failure and he was treated with IV Lasix.  His echocardiogram   confirmed EF of 30%.  Cardiologist, Dr. Mervat Price, was consulted.    Given that his echo was done during his heart rate being elevated, a repeat   echocardiogram was done.  After digoxin and carvedilol, it was repeated and it   showed EF of 20% and global hypokinesis; however, the patient was more   asymptomatic at this time.  He did not require any more Lasix.  He was on room   air.  He was treated with aspirin, Lipitor 40 mg, carvedilol 12.5 mg b.i.d.,   lisinopril 5 mg daily and spironolactone 12.5 mg daily.  During hospital   course, also was evaluated for his renal failure.  His ultrasound showed   normal renal ultrasound.  Patient  apparently has been taking over-the-counter   NSAIDs.  I have advised him to stop taking it.  He has some proteinuria noted   as well.  His creatinine; however, improved to 1.39 and his potassium which   was normal and he was tolerating both ACE inhibitor and spironolactone.  I   have advised patient to follow with his primary care physician in a week to   followup labs and he is started on new medication.  He received CHF and diet   education during hospital course.  He is advised to follow with outpatient   cardiologist and CHF clinic for followup of his cardiomyopathy, and it was   thought to be probably from alcohol-induced.      Patient also during hospital course had influenza A positive.  He was treated   with Tamiflu and he will complete a course of Tamiflu.  Patient is advised to   have a mask for the next 5 days.      Patient was noted to have mild anemia, his hemoglobin is 11.2 at the time of   discharge.  On evaluation, he is noted to have iron deficiency anemia.    Patient never had a colonoscopy done.  Patient had no blood in stool or   melena.  He was advised to get occult blood test as outpatient.  He started on   iron supplements.  He needs an outpatient colonoscopy with a primary care   physician.    IMAGIN.  Cardiac stress test, 2017.  There are patchy defects along the   medial aspect of the anterior and inferior wall with some questionable patchy   reverisble defects.  The defects do not ___ normal vascular territory.  Given   the elevated heart rate during the stress test, these defects could be   artifact from left bundle-branch block, akinetic septal wall, severely reduced   ejection fraction 36%, left ventricular size is upper limit of the normal,   nondiagnostic based on left bundle-branch block.  The results were reviewed by   cardiologist and a repeat echo was done, and it was thought to be noncardiac   and he will be following him as outpatient and they advise medical  management.     2.  Renal ultrasound.  Normal renal ultrasound.  No hydronephrosis.   3.  Echocardiogram, 03/23/2017.  Severely reduced left ventricular systolic   function of 20%, global hypokinesis with regional radiation.    4.  Echocardiogram, 03/20/2017.  No prior study available for comparison,   severely reduced left ventricular systolic function of 30% grade I diastolic   dysfunction, normal right ventricular size, structurally normal aortic valve,   mild mitral regurgitation, thickened mitral leaflets, trivial pericardial   effusion.          DISCHARGE LABS:    Recent Labs      03/22/17   0807  03/23/17 0246   WBC  4.9  4.2*   RBC  4.48*  4.25*   HEMOGLOBIN  11.8*  11.2*   HEMATOCRIT  37.8*  35.1*   MCV  84.4  82.6   MCH  26.3*  26.4*   RDW  53.1*  51.8*   PLATELETCT  223  204   MPV  10.4  10.2   NEUTSPOLYS   --   52.60   LYMPHOCYTES   --   28.10   MONOCYTES   --   16.70*   EOSINOPHILS   --   1.70   BASOPHILS   --   0.90   RBCMORPHOLO   --   Present     Lab Results   Component Value Date    [id number] 895 03/21/2017    FOLATE 19.7 03/21/2017    FERRITIN 81.0 03/21/2017    IRON 24* 03/21/2017    TOTIRONBC 358 03/21/2017       Estimated GFR/CRCL = Estimated Creatinine Clearance: 57.6 mL/min (by C-G formula based on Cr of 1.39).  Recent Labs      03/21/17   1200  03/22/17   0807  03/23/17   0246  03/24/17   0307   SODIUM   --   135  133*  136   POTASSIUM   --   4.7  3.8  4.1   CHLORIDE   --   104  103  104   CO2   --   24 26  23   BUN   --   8  12  10   CREATININE   --   1.43*  1.46*  1.39   CALCIUM   --   9.3  8.8  9.2   PHOSPHORUS  2.4*   --    --    --    ALBUMIN   --    --   3.5   --        Recent Labs      03/23/17 0246   ALTSGPT  7   ASTSGOT  14   ALKPHOSPHAT  43   TBILIRUBIN  0.2   ALBUMIN  3.5   GLOBULIN  3.4       @PNLABRCNT(GLUCOSE:3,POCGLUCOSE:3)  )  Lab Results   Component Value Date    FREET4 0.85 03/21/2017    CORTISOL 8.7 03/20/2017       DISCHARGE MEDICATIONS:  (X)  Medication  Reconciliation Completed   Seth Olivera   Home Medication Instructions CATRACHO:[id number]    Printed on:03/24/17 2005   Medication Information                      aspirin (ASA) 81 MG Chew Tab chewable tablet  Take 1 Tab by mouth every day.             atorvastatin (LIPITOR) 40 MG Tab  Take 1 Tab by mouth every bedtime.             carvedilol (COREG) 12.5 MG Tab  Take 1 Tab by mouth 2 times a day, with meals.             Cholecalciferol (VITAMIN D PO)  Take 3 Caps by mouth every 7 days.             ferrous gluconate (FERGON) 324 (38 FE) MG Tab  Take 1 Tab by mouth every morning with breakfast.             fluticasone (FLONASE) 50 MCG/ACT nasal spray  Spray 2 Sprays in nose every day.             lisinopril (PRINIVIL) 5 MG Tab  Take 1 Tab by mouth every day.             multivitamin (THERAGRAN) Tab  Take 1 Tab by mouth every day.             oseltamivir (TAMIFLU) 30 MG Cap  Take 1 Cap by mouth every 12 hours for 3 days.             spironolactone (ALDACTONE) 25 MG Tab  Take 0.5 Tabs by mouth every day.                 INSTRUCTIONS:   The patient was instructed to return to the ER in the event of worsening symptoms. I have counseled the patient on the importance of compliance and the patient has agreed to proceed with all medical recommendations and follow up plan indicated above.   The patient understands that all medications come with benefits and risks. Risks may include permanent injury or death and these risks can be minimized with close reassessment and monitoring.        I discharge paperwork and arranging for patient follow up 35 mins.

## 2017-03-24 NOTE — PROGRESS NOTES
Bedside report received. Assumed care of patient. Patient A&Ox4, and included in POC. Educated patient about safety; bed in low and locked position, call light within reach. Assessment performed and patient reports no pain. Patient ambulating to the bathroom independently. Refusing Bed alarm at this time

## 2017-03-24 NOTE — PROGRESS NOTES
Hospitalist RN note:    HF follow up w/ Dr. Mcgarry 3/30/17    PCP 5/1/17 Toñito KOROMA    Patient REFUSED discharge clinic despite education on importance of follow up, especially for heart failure. Stated he would not go if we scheduled. Patient verbalized understanding/acceptance of risks associated with poor follow up.

## 2017-03-24 NOTE — PROGRESS NOTES
Bedside report completed.  Assumed pt care. Patient sitting at edge of bed, eating dinner, in no apparent distress.  Safety precautions in place. Call light & personal belongings within reach.  No reports of pain, no needs expressed.  Would just like to sleep.  On room air, IV is saline locked.  Plan of care discussed.  Will continue to monitor with hourly rounding in place.

## 2017-03-24 NOTE — CARE PLAN
Problem: Venous Thromboembolism (VTW)/Deep Vein Thrombosis (DVT) Prevention:  Goal: Patient will participate in Venous Thrombosis (VTE)/Deep Vein Thrombosis (DVT)Prevention Measures  Outcome: PROGRESSING AS EXPECTED  Intervention: Ensure patient wears graduated elastic stockings (RADHA hose) and/or SCDs, if ordered, when in bed or chair (Remove at least once per shift for skin check)  Patient has been educated, but continues to refuse SCDs, patient ambulates frequently, on unfractionated heparin injections for DVT prophylaxis.      Problem: Knowledge Deficit  Goal: Knowledge of disease process/condition, treatment plan, diagnostic tests, and medications will improve  Outcome: PROGRESSING AS EXPECTED  Intervention: Explain information regarding disease process/condition, treatment plan, diagnostic tests, and medications and document in education  Patient educated and understands his disease process, any testing required, treatment plan and medications being administered up to this point in his plan of care.

## 2017-03-24 NOTE — HEART FAILURE PROGRAM
Cardiovascular Nurse Navigator () Progress Note:     This CNN met with patient at bedside. Pt resides locally takes the bus and can get rides to appointments and to  meds. Patient is very motivated to manage his condition because he does not want to come back to the hospital.    Pt has an [facility] and with new PCP on 5/1. All of these appointments were discussed with the patient who denies any barriers to attendance.    Pt has been given a scale to take home. Reviewed anatomy and physiology of systolic heart failure with patient. Discussed daily weights, sodium restriction, worsening signs and symptoms to report to physician, heart medications, and importance of adherence to medication regimen.     Patient states full understanding of all information given. Pt did struggle with which weights and when were concerning. As such, this CNN spent extra time on this topic until pt could fully teach back. This CNN also wrote the criteria on pt's calendar in black sharpie. Pt also denies any difficulty with obtaining medications, getting to and from appointments, weighing daily, and obtaining low sodium foods.

## 2017-03-24 NOTE — PROGRESS NOTES
MS  SR [id number]  Edgar down to 47 around 0000  1.6 second pause around 2000  .16/.10/.40  4 beats [facility] @ 0429

## 2017-03-24 NOTE — PROGRESS NOTES
Discharge Instructions    Discharged to home by car with friend. Discharged via walking, hospital escort: Yes.  Special equipment needed: Not Applicable    Be sure to schedule a follow-up appointment with your primary care doctor or any specialists as instructed.     Discharge Plan:   Diet Plan: Discussed  Activity Level: Discussed  Smoking Cessation Offered: Patient Refused  Confirmed Follow up Appointment: Appointment Scheduled  Confirmed Symptoms Management: Discussed  Medication Reconciliation Updated: Yes  Pneumococcal Vaccine Given - only chart on this line when given: Given (See MAR)  Influenza Vaccine Indication: Not indicated: Previously immunized this influenza season and > 8 years of age  Influenza Vaccine Given - only chart on this line when given: Influenza Vaccine Given (See MAR)    I understand that a diet low in cholesterol, fat, and sodium is recommended for good health. Unless I have been given specific instructions below for another diet, I accept this instruction as my diet prescription.   Other diet: Cardiac 2g Na    Special Instructions:   HF Patient Discharge Instructions  · Monitor your weight daily, and maintain a weight chart, to track your weight changes.   · Activity as tolerated, unless your Doctor has ordered otherwise. Other activity order: As tolerated.  · Follow a low fat, low cholesterol, low salt diet unless instructed otherwise by your Doctor. Read the labels on the back of food products and track your intake of fat, cholesterol and salt.   · Fluid Restriction No. If a Fluid Restriction has been ordered by your Doctor, measure fluids with a measuring cup to ensure that you are not exceeding the restriction.   · No smoking.  · Oxygen No. If your Doctor has ordered that you wear Oxygen at home, it is important to wear it as ordered.  · Did you receive an explanation from staff on the importance of taking each of your medications and why it is necessary to keep taking them unless  your doctor says to stop? Yes  · Were all of your questions answered about how to manage your heart failure and what to do if you have increased signs and symptoms after you go home? Yes  · Do you feel like your heart failure care team involved you in the care treatment plan and allowed you to make decisions regarding your care while in the hospital and addressed any discharge needs you might have? Yes    See the educational handout provided at discharge for more information on monitoring your daily weight, activity and diet. This also explains more about Heart Failure, symptoms of a flare-up and some of the tests that you have undergone.     Warning Signs of a Flare-Up include:  · Swelling in the ankles or lower legs.  · Shortness of breath, while at rest, or while doing normal activities.   · Shortness of breath at night when in bed, or coughing in bed.   · Requiring more pillows to sleep at night, or needing to sit up at night to sleep.  · Feeling weak, dizzy or fatigued.     When to call your Doctor:  · Call [address] floor about questions regarding the discharge instructions you were given [phone].  · Call your Primary Care Physician or Cardiologist if:   1. You experience any pain radiating to your jaw or neck.  2. You have any difficulty breathing.  3. You experience weight gain of 3 lbs in a day or 5 lbs in a week.   4. You feel any palpitations or irregular heartbeats.  5. You become dizzy or lose consciousness.   If you have had an angiogram or had a pacemaker or AICD placed, and experience:  1. Bleeding, drainage or swelling at the surgical / puncture site.  2. Fever greater than 100.0 F  3. Shock from internal defibrillator.  4. Cool and / or numb extremities.      · Is patient discharged on Warfarin / Coumadin?   No     · Is patient Post Blood Transfusion?  No    Depression / Suicide Risk    As you are discharged from this [facility], it is important to learn how to keep  safe from harming yourself.    Recognize the warning signs:  · Abrupt changes in personality, positive or negative- including increase in energy   · Giving away possessions  · Change in eating patterns- significant weight changes-  positive or negative  · Change in sleeping patterns- unable to sleep or sleeping all the time   · Unwillingness or inability to communicate  · Depression  · Unusual sadness, discouragement and loneliness  · Talk of wanting to die  · Neglect of personal appearance   · Rebelliousness- reckless behavior  · Withdrawal from people/activities they love  · Confusion- inability to concentrate     If you or a loved one observes any of these behaviors or has concerns about self-harm, here's what you can do:  · Talk about it- your feelings and reasons for harming yourself  · Remove any means that you might use to hurt yourself (examples: pills, rope, extension cords, firearm)  · Get professional help from the community (Mental Health, Substance Abuse, psychological counseling)  · Do not be alone:Call your Safe Contact- someone whom you trust who will be there for you.  · Call your local CRISIS HOTLINE 969-9467 or 002-638-2743  · Call your local Children's Mobile Crisis Response Team [place] (617) 739-5521 or www.W[employer] studio  · Call the toll free National Suicide Prevention Hotlines   · National Suicide Prevention Lifeline 780-243-RYYT (4109)  · National Hope Line Network 800-SUICIDE (777-5769)

## 2017-03-25 ENCOUNTER — PATIENT OUTREACH (OUTPATIENT)
Dept: HEALTH INFORMATION MANAGEMENT | Facility: OTHER | Age: 67
End: 2017-03-25

## 2017-03-25 NOTE — DISCHARGE SUMMARY
DATE OF ADMISSION:  03/20/2017    DATE OF DISCHARGE:  03/24/2017    DISCHARGE DIAGNOSES:    1.  Cardiomyopathy, possibly nonischemic from alcoholism with ejection   fraction of 20% (new diagnosis).    2.  Near syncope, likely cardiogenic.    3.  Acute kidney injury on chronic kidney disease stage III.    4.  Iron deficiency anemia.    5.  Ongoing alcohol dependence.    6.  Influenza pneumonia.      HISTORY OF PRESENT ILLNESS AND HOSPITAL COURSE:  Please refer to HPI dictated   by Dr. Shreyas Pereyra on 03/20/2017 for complete details.  In brief, the patient   is a 66-year-old male who presented to the emergency room with complaints of   dyspnea on exertion.  The patient has been having more exertional dyspnea with   walking and having sex as well.  Patient used to drink a lot of alcohol.  He   denied recreational drug use or smoking currently; however, he said that he   has been drinking alcohol more than usual.  Patient was suspected to have   congestive heart failure and he was treated with IV Lasix.  His echocardiogram   confirmed EF of 30%.  Cardiologist, Dr. Mervat Price, was consulted.    Given that his echo was done during his heart rate being elevated, a repeat   echocardiogram was done.  After digoxin and carvedilol, it was repeated and it   showed EF of 20% and global hypokinesis; however, the patient was more   asymptomatic at this time.  He did not require any more Lasix.  He was on room   air.  He was treated with aspirin, Lipitor 40 mg, carvedilol 12.5 mg b.i.d.,   lisinopril 5 mg daily and spironolactone 12.5 mg daily.  During hospital   course, also was evaluated for his renal failure.  His ultrasound showed   normal renal ultrasound.  Patient apparently has been taking over-the-counter   NSAIDs.  I have advised him to stop taking it.  He has some proteinuria noted   as well.  His creatinine; however, improved to 1.39 and his potassium which   was normal and he was tolerating both ACE inhibitor and  spironolactone.  I   have advised patient to follow with his primary care physician in a week to   followup labs and he is started on new medication.  He received CHF and diet   education during hospital course.  He is advised to follow with outpatient   cardiologist and CHF clinic for followup of his cardiomyopathy, and it was   thought to be probably from alcohol-induced.      Patient also during hospital course had influenza A positive.  He was treated   with Tamiflu and he will complete a course of Tamiflu.  Patient is advised to   have a mask for the next 5 days.      Patient was noted to have mild anemia, his hemoglobin is 11.2 at the time of   discharge.  On evaluation, he is noted to have iron deficiency anemia.    Patient never had a colonoscopy done.  Patient had no blood in stool or   melena.  He was advised to get occult blood test as outpatient.  He started on   iron supplements.  He needs an outpatient colonoscopy with a primary care   physician.    IMAGIN.  Cardiac stress test, 2017.  There are patchy defects along the   medial aspect of the anterior and inferior wall with some questionable patchy   reverisble defects.  The defects do not ___ normal vascular territory.  Given   the elevated heart rate during the stress test, these defects could be   artifact from left bundle-branch block, akinetic septal wall, severely reduced   ejection fraction 36%, left ventricular size is upper limit of the normal,   nondiagnostic based on left bundle-branch block.  The results were reviewed by   cardiologist and a repeat echo was done, and it was thought to be noncardiac   and he will be following him as outpatient and they advise medical management.     2.  Renal ultrasound.  Normal renal ultrasound.  No hydronephrosis.   3.  Echocardiogram, 2017.  Severely reduced left ventricular systolic   function of 20%, global hypokinesis with regional radiation.    4.  Echocardiogram, 2017.  No  prior study available for comparison,   severely reduced left ventricular systolic function of 30% grade I diastolic   dysfunction, normal right ventricular size, structurally normal aortic valve,   mild mitral regurgitation, thickened mitral leaflets, trivial pericardial   effusion.       ____________________________________     MD SOFÍA LY / SANDI    DD:  03/24/2017 18:31:36  DT:  03/24/2017 22:54:22    D#:  [id number]  Job#:  [id number]

## 2017-03-27 LAB
BACTERIA BLD CULT: NORMAL
BACTERIA BLD CULT: NORMAL
SIGNIFICANT IND 70042: NORMAL
SIGNIFICANT IND 70042: NORMAL
SITE SITE: NORMAL
SITE SITE: NORMAL
SOURCE SOURCE: NORMAL
SOURCE SOURCE: NORMAL

## 2017-03-30 ENCOUNTER — HOSPITAL ENCOUNTER (OUTPATIENT)
Dept: LAB | Facility: MEDICAL CENTER | Age: 67
End: 2017-03-30
Attending: INTERNAL MEDICINE
Payer: MEDICARE

## 2017-03-30 ENCOUNTER — OFFICE VISIT (OUTPATIENT)
Dept: CARDIOLOGY | Facility: MEDICAL CENTER | Age: 67
End: 2017-03-30
Payer: MEDICARE

## 2017-03-30 VITALS
BODY MASS INDEX: 21.69 KG/M2 | SYSTOLIC BLOOD PRESSURE: 120 MMHG | HEART RATE: 96 BPM | WEIGHT: 169 LBS | OXYGEN SATURATION: 94 % | HEIGHT: 74 IN | DIASTOLIC BLOOD PRESSURE: 62 MMHG

## 2017-03-30 DIAGNOSIS — I42.9 CARDIOMYOPATHY (HCC): ICD-10-CM

## 2017-03-30 DIAGNOSIS — I50.9 CHF, STAGE C (HCC): ICD-10-CM

## 2017-03-30 DIAGNOSIS — I50.20 NYHA CLASS 1 HEART FAILURE WITH REDUCED EJECTION FRACTION (HCC): ICD-10-CM

## 2017-03-30 LAB
ANION GAP SERPL CALC-SCNC: 10 MMOL/L (ref 0–11.9)
BUN SERPL-MCNC: 18 MG/DL (ref 8–22)
CALCIUM SERPL-MCNC: 9.9 MG/DL (ref 8.5–10.5)
CHLORIDE SERPL-SCNC: 104 MMOL/L (ref 96–112)
CO2 SERPL-SCNC: 26 MMOL/L (ref 20–33)
CREAT SERPL-MCNC: 1.54 MG/DL (ref 0.5–1.4)
GLUCOSE SERPL-MCNC: 104 MG/DL (ref 65–99)
POTASSIUM SERPL-SCNC: 4.5 MMOL/L (ref 3.6–5.5)
SODIUM SERPL-SCNC: 140 MMOL/L (ref 135–145)

## 2017-03-30 PROCEDURE — G8420 CALC BMI NORM PARAMETERS: HCPCS | Performed by: INTERNAL MEDICINE

## 2017-03-30 PROCEDURE — 1036F TOBACCO NON-USER: CPT | Performed by: INTERNAL MEDICINE

## 2017-03-30 PROCEDURE — 36415 COLL VENOUS BLD VENIPUNCTURE: CPT

## 2017-03-30 PROCEDURE — 99214 OFFICE O/P EST MOD 30 MIN: CPT | Mod: 25 | Performed by: INTERNAL MEDICINE

## 2017-03-30 PROCEDURE — G8432 DEP SCR NOT DOC, RNG: HCPCS | Performed by: INTERNAL MEDICINE

## 2017-03-30 PROCEDURE — 3017F COLORECTAL CA SCREEN DOC REV: CPT | Performed by: INTERNAL MEDICINE

## 2017-03-30 PROCEDURE — G8482 FLU IMMUNIZE ORDER/ADMIN: HCPCS | Performed by: INTERNAL MEDICINE

## 2017-03-30 PROCEDURE — 94620 PR PULMONARY STRESS TESTING,SIMPLE: CPT | Mod: GZ | Performed by: INTERNAL MEDICINE

## 2017-03-30 PROCEDURE — 1101F PT FALLS ASSESS-DOCD LE1/YR: CPT | Mod: 8P | Performed by: INTERNAL MEDICINE

## 2017-03-30 PROCEDURE — 80048 BASIC METABOLIC PNL TOTAL CA: CPT

## 2017-03-30 PROCEDURE — 4040F PNEUMOC VAC/ADMIN/RCVD: CPT | Performed by: INTERNAL MEDICINE

## 2017-03-30 ASSESSMENT — MINNESOTA LIVING WITH HEART FAILURE QUESTIONNAIRE (MLHF)
FEELING LIKE A BURDEN TO FAMILY AND FRIENDS: NO
MAKING YOU SHORT OF BREATH: VERY MUCH
MAKING YOU WORRY: **
SWELLING IN ANKLES OR LEGS: NO
EATING LESS FOODS YOU LIKE: NO
DIFFICULTY SOCIALIZING WITH FAMILY OR FRIENDS: NO
COSTING YOU MONEY FOR MEDICAL CARE: NO
DIFFICULTY WITH SEXUAL ACTIVITIES: VERY MUCH
GIVING YOU SIDE EFFECTS FROM TREATMENTS: NO
DIFFICULTY WITH RECREATIONAL PASTIMES, SPORTS, HOBBIES: NO
MAKING YOU FEEL DEPRESSED: VERY LITTLE
LOSS OF SELF CONTROL IN YOUR LIFE: NO
MAKING YOU STAY IN A HOSPITAL: NO
DIFFICULTY SLEEPING WELL AT NIGHT: VERY MUCH
WALKING ABOUT OR CLIMBING STAIRS DIFFICULT: NO
DIFFICULTY WORKING TO EARN A LIVING: NO
TOTAL_SCORE: 25
DIFFICULTY GOING AWAY FROM HOME: NO
HAVING TO SIT OR LIE DOWN DURING THE DAY: NO
WORKING AROUND THE HOUSE OR YARD DIFFICULT: NO
DIFFICULTY TO CONCENTRATE OR REMEMBERING THINGS: NO
TIRED, FATIGUED OR LOW ON ENERGY: VERY MUCH

## 2017-03-30 ASSESSMENT — NEW YORK HEART ASSOCIATION (NYHA) CLASSIFICATION: NYHA FUNCTIONAL CLASS: CLASS L

## 2017-03-30 ASSESSMENT — 6 MINUTE WALK TEST (6MWT): TOTAL DISTANCE WALKED (METERS): 442

## 2017-03-30 NOTE — PROGRESS NOTES
Subjective:   Seth Olivera is a 66 y.o. male who presents today for follow-up evaluation after recent admission for congestive heart failure. He was found to have a severely reduced ejection fraction. This was about 35% on nuclear scanning but lower on echocardiography. His nuclear scan was nondiagnostic for ischemia.    Since discharge he has been doing well. He denies any dyspnea on exertion, PND, orthopnea or edema. He's had no chest discomfort, palpitations or lightheadedness. He has had a cough since discharge. Of note, he has not been taking any of his medications because he could not afford them. This has been for about a week.        Past Medical History   Diagnosis Date   • High cholesterol      Past Surgical History   Procedure Laterality Date   • Other orthopedic surgery       hardware in back     History reviewed. No pertinent family history.  History   Smoking status   • Former Smoker   • Types: Cigarettes   Smokeless tobacco   • Never Used     No Known Allergies  Outpatient Encounter Prescriptions as of 3/30/2017   Medication Sig Dispense Refill   • aspirin (ASA) 81 MG Chew Tab chewable tablet Take 1 Tab by mouth every day. 100 Tab 1   • lisinopril (PRINIVIL) 5 MG Tab Take 1 Tab by mouth every day. 60 Tab 1   • multivitamin (THERAGRAN) Tab Take 1 Tab by mouth every day. 30 Tab    • carvedilol (COREG) 12.5 MG Tab Take 1 Tab by mouth 2 times a day, with meals. 60 Tab 1   • fluticasone (FLONASE) 50 MCG/ACT nasal spray Spray 2 Sprays in nose every day. 16 g 1   • spironolactone (ALDACTONE) 25 MG Tab Take 0.5 Tabs by mouth every day. 30 Tab 1   • ferrous gluconate (FERGON) 324 (38 FE) MG Tab Take 1 Tab by mouth every morning with breakfast. 30 Tab 1   • atorvastatin (LIPITOR) 40 MG Tab Take 1 Tab by mouth every bedtime. 30 Tab 1   • Cholecalciferol (VITAMIN D PO) Take 3 Caps by mouth every 7 days.       No facility-administered encounter medications on file as of 3/30/2017.     ROS     Objective:   BP  "120/62 mmHg  Pulse 96  Ht 1.88 m (6' 2.02\")  Wt 76.658 kg (169 lb)  BMI 21.69 kg/m2  SpO2 94%    Physical Exam    Assessment:     1. Cardiomyopathy (CMS-[facility])     2. CHF, stage C (CMS-[facility])     3. NYHA class 1 heart failure with reduced ejection fraction (CMS-[facility])         Medical Decision Making:  Today's Assessment / Status / Plan:     Mr. Olivera is clinically stable. He is going to start his medication on Monday after he receives his disability check. We will see him a couple weeks after he restarts the medication with a BMP prior. He has a symptomatic from a cardiovascular standpoint at the present time.  "

## 2017-03-30 NOTE — MR AVS SNAPSHOT
"Seth Olivera   3/30/2017 7:45 AM   Office Visit   MRN: [id number]    Department:  [facility]   Dept Phone:  [phone]    Description:  Male : 1950   Provider:  Al Mcgarry M.D.           Reason for Visit     New Patient HF New       Allergies as of 3/30/2017     No Known Allergies      You were diagnosed with     Cardiomyopathy (CMS-HCC)   [9391065]       CHF, stage C (CMS-HCC)   [397377]       NYHA class 1 heart failure with reduced ejection fraction (CMS-HCC)   [5020037]         Vital Signs     Blood Pressure Pulse Height Weight Body Mass Index Oxygen Saturation    120/62 mmHg 96 1.88 m (6' 2.02\") 76.658 kg (169 lb) 21.69 kg/m2 94%    Smoking Status                   Former Smoker           Basic Information     Date Of Birth Sex Race Ethnicity Preferred Language    1950 Male Black or  Non- English      Your appointments     2017  7:40 AM   Heart Failure Established with JENNY Arnold   [facility] (--)    [address], [address]  [place] [address]   [phone]            May 01, 2017  9:20 AM   New Patient with [facility] [address]    [address]  [address]  [place] [address]   [phone]           Please bring Photo ID, Insurance Cards, All Medication Bottles and copies of any legal documents (such as Living Will, Power of ) If speaking a language besides English please bring an adult . Please arrive 30 minutes prior for check in and registration. You will be receiving a confirmation call a few days before your appointment from our automated call confirmation system.              Problem List              ICD-10-CM Priority Class Noted - Resolved    Palpitations R00.2   3/20/2017 - Present    DELAROSA (dyspnea on exertion) R06.09 High  3/20/2017 - Present    Near syncope R55 High  3/20/2017 - Present    Abdominal distension " (gaseous) R14.0   3/20/2017 - Present    JUAN CARLOS (acute kidney injury) (CMS-HCC) N17.9 High  3/20/2017 - Present    Cardiomyopathy (CMS-HCC) I42.9 High  3/21/2017 - Present    Iron deficiency anemia D50.9 Medium  3/21/2017 - Present    Alcohol dependence (CMS-HCC) F10.20 Low  3/21/2017 - Present    CHF, stage C (CMS-HCC) I50.9   3/30/2017 - Present    NYHA class 1 heart failure with reduced ejection fraction (CMS-HCC) I50.9   3/30/2017 - Present      Health Maintenance        Date Due Completion Dates    IMM DTaP/Tdap/Td Vaccine (1 - Tdap) 8/4/1970 ---    COLONOSCOPY 8/4/2001 ---    IMM ZOSTER VACCINE 8/4/2011 ---    IMM PNEUMOCOCCAL 65+ (ADULT) LOW/MEDIUM RISK SERIES (2 of 2 - PPSV23) 3/22/2018 3/22/2017            Current Immunizations     13-VALENT PCV PREVNAR 3/22/2017  5:57 AM    Influenza Vaccine Adult HD 3/22/2017  5:56 AM      Below and/or attached are the medications your provider expects you to take. Review all of your home medications and newly ordered medications with your provider and/or pharmacist. Follow medication instructions as directed by your provider and/or pharmacist. Please keep your medication list with you and share with your provider. Update the information when medications are discontinued, doses are changed, or new medications (including over-the-counter products) are added; and carry medication information at all times in the event of emergency situations     Allergies:  No Known Allergies          Medications  Valid as of: March 30, 2017 -  8:17 AM    Generic Name Brand Name Tablet Size Instructions for use    Aspirin (Chew Tab) ASA 81 MG Take 1 Tab by mouth every day.        Atorvastatin Calcium (Tab) LIPITOR 40 MG Take 1 Tab by mouth every bedtime.        Carvedilol (Tab) COREG 12.5 MG Take 1 Tab by mouth 2 times a day, with meals.        Cholecalciferol   Take 3 Caps by mouth every 7 days.        Ferrous Gluconate (Tab) FERGON 324 (38 FE) MG Take 1 Tab by mouth every morning with  breakfast.        Fluticasone Propionate (Suspension) FLONASE 50 MCG/ACT Spray 2 Sprays in nose every day.        Lisinopril (Tab) PRINIVIL 5 MG Take 1 Tab by mouth every day.        Multiple Vitamin (Tab) THERAGRAN  Take 1 Tab by mouth every day.        Spironolactone (Tab) ALDACTONE 25 MG Take 0.5 Tabs by mouth every day.        .                 Medicines prescribed today were sent to:     [facility] [address] [place] [address] - [address] [facility]    [address] [facility] [place] [address]    Phone: [phone] Fax: [phone]    Open 24 Hours?: Yes      Medication refill instructions:       If your prescription bottle indicates you have medication refills left, it is not necessary to call your provider’s office. Please contact your pharmacy and they will refill your medication.    If your prescription bottle indicates you do not have any refills left, you may request refills at any time through one of the following ways: The online GoNogging system (except Urgent Care), by calling your provider’s office, or by asking your pharmacy to contact your provider’s office with a refill request. Medication refills are processed only during regular business hours and may not be available until the next business day. Your provider may request additional information or to have a follow-up visit with you prior to refilling your medication.   *Please Note: Medication refills are assigned a new Rx number when refilled electronically. Your pharmacy may indicate that no refills were authorized even though a new prescription for the same medication is available at the pharmacy. Please request the medicine by name with the pharmacy before contacting your provider for a refill.        Your To Do List     Future Labs/Procedures Complete By Expires    BASIC METABOLIC PANEL  As directed 3/30/2018         GoNogging Access Code: [email or web address]  Expires: 4/23/2017  9:54 AM    GoNogging  A secure, online tool to  manage your health information     [employer]’s [facility] Discovery® is a secure, online tool that connects you to your personalized health information from the privacy of your home -- day or night - making it very easy for you to manage your healthcare. Once the activation process is completed, you can even access your medical information using the [facility] Discovery sergey, which is available for free in the Apple Sergey store or Google Play store.     [facility] Discovery provides the following levels of access (as shown below):   My Chart Features   [facility] Primary Care Doctor [facility] [place] [facility]  Specialists [facility]  Urgent  Care Non-[facility]  Primary Care  Doctor   Email your healthcare team securely and privately 24/7 X X X    Manage appointments: schedule your next appointment; view details of past/upcoming appointments X      Request prescription refills. X      View recent personal medical records, including lab and immunizations X X X X   View health record, including health history, allergies, medications X X X X   Read reports about your outpatient visits, procedures, consult and ER notes X X X X   See your discharge summary, which is a recap of your hospital and/or ER visit that includes your diagnosis, lab results, and care plan. X X       How to register for Quest Discovery:  1. Go to  [email or web address].  2. Click on the Sign Up Now box, which takes you to the New Member Sign Up page. You will need to provide the following information:  a. Enter your Quest Discovery Access Code exactly as it appears at the top of this page. (You will not need to use this code after you’ve completed the sign-up process. If you do not sign up before the expiration date, you must request a new code.)   b. Enter your date of birth.   c. Enter your home email address.   d. Click Submit, and follow the next screen’s instructions.  3. Create a Quest Discovery ID. This will be your Quest Discovery login ID and cannot be changed, so think of one that is secure and easy to remember.  4. Create a [employer]  password. You can change your password at any time.  5. Enter your Password Reset Question and Answer. This can be used at a later time if you forget your password.   6. Enter your e-mail address. This allows you to receive e-mail notifications when new information is available in [employer] Below.  7. Click Sign Up. You can now view your health information.    For assistance activating your [employer] Below account, call [phone]

## 2017-03-31 NOTE — PROGRESS NOTES
"Heart Failure New Appointment    HF RN not available, HF education to be performed on next visit.    OP Heart Failure  Vitals  Weight: 76.658 kg (169 lb)  How Weight Obtained: Stand Up Scale  Height: 188 cm (6' 2.02\")  BMI (Calculated): 21.69  Blood Pressure : 120/62 mmHg  Pulse: 96    System Assessment  NYHA Functional Class Assessment: Class l  ACC/AHA HF Stage: C    MN Living with Heart Failure  Swelling in Ankles or Legs: No  Having to Sit or Lie Down During the Day: No  Walking About or Climbing Stairs Difficult: No  Working Around the House or Yard Difficult: No  Difficulty Going Away from Home: No  Difficulty Sleeping Well at Night: Very Much  Difficulty Socializing with Family or Friends: No  Difficulty Working to Earn a Living: No  Difficulty with Recreational Pastimes, Sports, Hobbies: No  Difficulty with Sexual Activities: Very Much  Eating Less Foods You Like: No  Making you Short of Breath: Very Much  Tired, Fatigued or Low on Energy: Very Much  Making you Stay in a Hospital: No  Costing you Money for Medical Care?: No  Giving you Side Effects from Treatments: No  Feeling like a [place] to Family and Friends: No  Loss of Self Control in your Life: No  Making You Worry: **  Difficulty to Concentrate or Remembering Things: No  Making you Feel Depressed: Very Little  MLHF Total Score : 25    6 Minute Walk Test  Baseline to end of test: 6:00  Total meters walked: 442       Dasia HF RN  [phone]              "

## 2017-04-18 ENCOUNTER — OFFICE VISIT (OUTPATIENT)
Dept: CARDIOLOGY | Facility: MEDICAL CENTER | Age: 67
End: 2017-04-18
Payer: MEDICARE

## 2017-04-18 VITALS
SYSTOLIC BLOOD PRESSURE: 100 MMHG | OXYGEN SATURATION: 95 % | HEIGHT: 74 IN | BODY MASS INDEX: 21.17 KG/M2 | DIASTOLIC BLOOD PRESSURE: 70 MMHG | WEIGHT: 165 LBS | HEART RATE: 90 BPM

## 2017-04-18 DIAGNOSIS — I50.20 ACC/AHA STAGE C SYSTOLIC HEART FAILURE (HCC): ICD-10-CM

## 2017-04-18 DIAGNOSIS — I50.20 NYHA CLASS 1 HEART FAILURE WITH REDUCED EJECTION FRACTION (HCC): ICD-10-CM

## 2017-04-18 PROCEDURE — G8420 CALC BMI NORM PARAMETERS: HCPCS | Performed by: NURSE PRACTITIONER

## 2017-04-18 PROCEDURE — 4040F PNEUMOC VAC/ADMIN/RCVD: CPT | Performed by: NURSE PRACTITIONER

## 2017-04-18 PROCEDURE — 3017F COLORECTAL CA SCREEN DOC REV: CPT | Performed by: NURSE PRACTITIONER

## 2017-04-18 PROCEDURE — 1101F PT FALLS ASSESS-DOCD LE1/YR: CPT | Mod: 8P | Performed by: NURSE PRACTITIONER

## 2017-04-18 PROCEDURE — 99214 OFFICE O/P EST MOD 30 MIN: CPT | Performed by: NURSE PRACTITIONER

## 2017-04-18 PROCEDURE — 1036F TOBACCO NON-USER: CPT | Performed by: NURSE PRACTITIONER

## 2017-04-18 PROCEDURE — G8432 DEP SCR NOT DOC, RNG: HCPCS | Performed by: NURSE PRACTITIONER

## 2017-04-18 RX ORDER — ATORVASTATIN CALCIUM 40 MG/1
40 TABLET, FILM COATED ORAL
Qty: 30 TAB | Refills: 11 | Status: SHIPPED | OUTPATIENT
Start: 2017-04-18 | End: 2017-08-31

## 2017-04-18 RX ORDER — SPIRONOLACTONE 25 MG/1
12.5 TABLET ORAL DAILY
Qty: 15 TAB | Refills: 11 | Status: SHIPPED | OUTPATIENT
Start: 2017-04-18 | End: 2017-07-26 | Stop reason: SDUPTHER

## 2017-04-18 RX ORDER — LISINOPRIL 5 MG/1
5 TABLET ORAL DAILY
Qty: 30 TAB | Refills: 11 | Status: SHIPPED | OUTPATIENT
Start: 2017-04-18 | End: 2017-07-26

## 2017-04-18 RX ORDER — CARVEDILOL 12.5 MG/1
12.5 TABLET ORAL 2 TIMES DAILY WITH MEALS
Qty: 60 TAB | Refills: 11 | Status: SHIPPED | OUTPATIENT
Start: 2017-04-18 | End: 2017-07-26

## 2017-04-18 ASSESSMENT — NEW YORK HEART ASSOCIATION (NYHA) CLASSIFICATION: NYHA FUNCTIONAL CLASS: CLASS L

## 2017-04-18 ASSESSMENT — ENCOUNTER SYMPTOMS
MYALGIAS: 0
COUGH: 0
PALPITATIONS: 0
ABDOMINAL PAIN: 0
FEVER: 0
SHORTNESS OF BREATH: 0
PND: 0
DIZZINESS: 0
CLAUDICATION: 0
ORTHOPNEA: 0

## 2017-04-18 NOTE — PROGRESS NOTES
Subjective:   Seth Olivera is a 66 y.o. male who presents today for follow up for his heart failure.    Patient of Dr. Mcgarry, Pt was last seen 3/30/17. Pt was told to start his medications after he received his disability check to pick them up from the pharmacy. Pt states he is taking what was instructed of him on the last visit but did not bring in the medications in with him and he does not know what meds and doses he is taking in with him today.     Patient feels well, denies chest pain, shortness of breath with rest, ADLs or exertion, palpitations, dizziness/lightheadedness, orthopnea, PND or Edema. Pt is able to walk up a flight of stairs or walk around town without difficulty. Pt does complain of some fatigue. Pt states his Home weights have been stable at 160-161 lbs.      Past Medical History   Diagnosis Date   • High cholesterol      Past Surgical History   Procedure Laterality Date   • Other orthopedic surgery       hardware in back     History reviewed. No pertinent family history.  History   Smoking status   • Former Smoker   • Types: Cigarettes   Smokeless tobacco   • Never Used     No Known Allergies  Outpatient Encounter Prescriptions as of 4/18/2017   Medication Sig Dispense Refill   • aspirin (ASA) 81 MG Chew Tab chewable tablet Take 1 Tab by mouth every day. 100 Tab 1   • lisinopril (PRINIVIL) 5 MG Tab Take 1 Tab by mouth every day. 60 Tab 1   • multivitamin (THERAGRAN) Tab Take 1 Tab by mouth every day. 30 Tab    • carvedilol (COREG) 12.5 MG Tab Take 1 Tab by mouth 2 times a day, with meals. 60 Tab 1   • fluticasone (FLONASE) 50 MCG/ACT nasal spray Spray 2 Sprays in nose every day. 16 g 1   • spironolactone (ALDACTONE) 25 MG Tab Take 0.5 Tabs by mouth every day. 30 Tab 1   • ferrous gluconate (FERGON) 324 (38 FE) MG Tab Take 1 Tab by mouth every morning with breakfast. 30 Tab 1   • atorvastatin (LIPITOR) 40 MG Tab Take 1 Tab by mouth every bedtime. 30 Tab 1   • Cholecalciferol (VITAMIN D PO) Take  "3 Caps by mouth every 7 days.       No facility-administered encounter medications on file as of 4/18/2017.     Review of Systems   Constitutional: Positive for malaise/fatigue. Negative for fever.   Respiratory: Negative for cough and shortness of breath.    Cardiovascular: Negative for chest pain, palpitations, orthopnea, claudication, leg swelling and PND.   Gastrointestinal: Negative for abdominal pain.   Musculoskeletal: Negative for myalgias.   Neurological: Negative for dizziness.   All other systems reviewed and are negative.       Objective:   /70 mmHg  Pulse 90  Ht 1.88 m (6' 2.02\")  Wt 74.844 kg (165 lb)  BMI 21.18 kg/m2  SpO2 95%    Physical Exam   Constitutional: He is oriented to person, place, and time. He appears well-developed and well-nourished.   HENT:   Head: Normocephalic and atraumatic.   Eyes: EOM are normal.   Neck: Normal range of motion. Neck supple. No JVD present.   Cardiovascular: Normal rate, regular rhythm, normal heart sounds and intact distal pulses.    No murmur heard.  Pulmonary/Chest: Effort normal and breath sounds normal. No respiratory distress. He has no wheezes. He has no rales.   Abdominal: Soft. Bowel sounds are normal.   Musculoskeletal: Normal range of motion. He exhibits no edema.   Neurological: He is alert and oriented to person, place, and time.   Skin: Skin is warm and dry.   Psychiatric: He has a normal mood and affect.   Nursing note and vitals reviewed.    Lab Results   Component Value Date/Time    CHOLESTEROL, 03/21/2017 01:03 AM    * 03/21/2017 01:03 AM    HDL 42 03/21/2017 01:03 AM    TRIGLYCERIDES 99 03/21/2017 01:03 AM       Lab Results   Component Value Date/Time    SODIUM 140 03/30/2017 08:49 AM    POTASSIUM 4.5 03/30/2017 08:49 AM    CHLORIDE 104 03/30/2017 08:49 AM    CO2 26 03/30/2017 08:49 AM    GLUCOSE 104* 03/30/2017 08:49 AM    BUN 18 03/30/2017 08:49 AM    CREATININE 1.54* 03/30/2017 08:49 AM     Lab Results   Component " Value Date/Time    ALKALINE PHOSPHATASE 43 03/23/2017 02:46 AM    AST(SGOT) 14 03/23/2017 02:46 AM    ALT(SGPT) 7 03/23/2017 02:46 AM    TOTAL BILIRUBIN 0.2 03/23/2017 02:46 AM     Transthoracic Echo Report 3/20/17  No prior study is available for comparison.   Severely reduced left ventricular systolic function.  Left ventricular ejection fraction is visually estimated to be 30%.  Grade I diastolic dysfunction.  Normal right ventricular size.  Structurally normal aortic valve.  Mild mitral regurgitation.  Thickened mitral valve leaflets.  Trivial pericardial effusion.  Discussed results with patient's nurse and hospitalist via phone.      Myocardial Perfusion Report 3/21/17   NUCLEAR IMAGING INTERPRETATION   There are patchy defects along the medial aspect of the anterior and inferior      wall with some questionable patchy reversible defects. The defects do not    confine to normal vascular territory.     Given the very elevated heart rate (140s) during stress test, these defects    could be artifact from left bundle branch block. As the result, the study is    nondiagnostic for myocardial perfusion.       Akinetic septal wall.  Severely reduced ejection fraction of 36%. Left    ventricular size is at upper limit of normal.     ECG INTERPRETATION   Nondiagnostic due to baseline LBBB    Transthoracic Echo Report 3/23/17  Severely reduced left ventricular systolic function. Left ventricular   ejection fraction is visually estimated to be 20%. Global hypokinesis   with regional variation. 3 mL of contrast was administered. Contrast   was used to enhance visualization of the endocardial border.  Compared to the images of the prior study done -  there has been no   improvement in left ventricular systolic function.      Assessment:     1. ACC/AHA stage C systolic heart failure (CMS-[facility])  atorvastatin (LIPITOR) 40 MG Tab    carvedilol (COREG) 12.5 MG Tab    lisinopril (PRINIVIL) 5 MG Tab    spironolactone (ALDACTONE) 25  MG Tab   2. NYHA class 1 heart failure with reduced ejection fraction (CMS-[facility])  atorvastatin (LIPITOR) 40 MG Tab    carvedilol (COREG) 12.5 MG Tab    lisinopril (PRINIVIL) 5 MG Tab    spironolactone (ALDACTONE) 25 MG Tab       Medical Decision Making:  Today's Assessment / Status / Plan:   1. HFrEF, EF 20%, Stage C, Class 1: Based on physical examination findings, patient is euvolemic. No JVD, lungs are clear to auscultation, no pitting edema in bilateral lower extremities, no ascites.  -Continue carvedilol 12.5 mg BID  -Continue lisinopril 5 mg daily  -Continue spironolactone 12.5 mg daily     -Reinforced s/sx of worsening heart failure with patient and weight monitoring. Pt verbalizes understanding. Pt to call office or RTC if present.     -Discussed with patient the importance of taking prescribed medications and their role in his heart failure management. Encouraged pt not to run out of his medications and to notify his pharmacy about 5 days before he runs out for a refill.    -Patient encouraged to bring in his medications or current list in with him at his next visit and all visits.    FU in clinic in 2 weeks. Sooner if needed.    Patient verbalizes understanding and agrees with the plan of care.      Collaborating MD: Sergio Ascencio MD

## 2017-04-18 NOTE — MR AVS SNAPSHOT
"Seth Olivera   2017 7:40 AM   Office Visit   MRN: [id number]    Department:  [facility]   Dept Phone:  [phone]    Description:  Male : 1950   Provider:  JENNY Arnold           Reason for Visit     Follow-Up           Allergies as of 2017     No Known Allergies      You were diagnosed with     ACC/AHA stage C systolic heart failure (CMS-[facility])   [2591479]       NYHA class 1 heart failure with reduced ejection fraction (CMS-[facility])   [2772032]         Vital Signs     Blood Pressure Pulse Height Weight Body Mass Index Oxygen Saturation    100/70 mmHg 90 1.88 m (6' 2.02\") 74.844 kg (165 lb) 21.18 kg/m2 95%    Smoking Status                   Former Smoker           Basic Information     Date Of Birth Sex Race Ethnicity Preferred Language    1950 Male Black or  Non- English      Your appointments     May 01, 2017  9:20 AM   New Patient with JUAN Zambrano   [facility] [address]    [address]  [address]  [facility] [address]   [phone]           Please bring Photo ID, Insurance Cards, All Medication Bottles and copies of any legal documents (such as Living Will, Power of ) If speaking a language besides English please bring an adult . Please arrive 30 minutes prior for check in and registration. You will be receiving a confirmation call a few days before your appointment from our automated call confirmation system.              Problem List              ICD-10-CM Priority Class Noted - Resolved    Palpitations R00.2   3/20/2017 - Present    DELAROSA (dyspnea on exertion) R06.09 High  3/20/2017 - Present    Near syncope R55 High  3/20/2017 - Present    Abdominal distension (gaseous) R14.0   3/20/2017 - Present    JUAN CARLOS (acute kidney injury) (CMS-HCC) N17.9 High  3/20/2017 - Present    Cardiomyopathy (CMS-HCC) I42.9 High  3/21/2017 - Present    Iron deficiency anemia D50.9 Medium  3/21/2017 - Present    Alcohol " dependence (CMS-HCC) F10.20 Low  3/21/2017 - Present    CHF, stage C (CMS-HCC) I50.9   3/30/2017 - Present    NYHA class 1 heart failure with reduced ejection fraction (CMS-HCC) I50.9   3/30/2017 - Present    ACC/AHA stage C systolic heart failure (CMS-HCC) I50.20   4/18/2017 - Present      Health Maintenance        Date Due Completion Dates    IMM DTaP/Tdap/Td Vaccine (1 - Tdap) 8/4/1970 ---    COLONOSCOPY 8/4/2001 ---    IMM ZOSTER VACCINE 8/4/2011 ---    IMM PNEUMOCOCCAL 65+ (ADULT) LOW/MEDIUM RISK SERIES (2 of 2 - PPSV23) 3/22/2018 3/22/2017            Current Immunizations     13-VALENT PCV PREVNAR 3/22/2017  5:57 AM    Influenza Vaccine Adult HD 3/22/2017  5:56 AM      Below and/or attached are the medications your provider expects you to take. Review all of your home medications and newly ordered medications with your provider and/or pharmacist. Follow medication instructions as directed by your provider and/or pharmacist. Please keep your medication list with you and share with your provider. Update the information when medications are discontinued, doses are changed, or new medications (including over-the-counter products) are added; and carry medication information at all times in the event of emergency situations     Allergies:  No Known Allergies          Medications  Valid as of: April 18, 2017 -  8:55 AM    Generic Name Brand Name Tablet Size Instructions for use    Aspirin (Chew Tab) ASA 81 MG Take 1 Tab by mouth every day.        Atorvastatin Calcium (Tab) LIPITOR 40 MG Take 1 Tab by mouth every bedtime.        Carvedilol (Tab) COREG 12.5 MG Take 1 Tab by mouth 2 times a day, with meals.        Cholecalciferol   Take 3 Caps by mouth every 7 days.        Ferrous Gluconate (Tab) FERGON 324 (38 FE) MG Take 1 Tab by mouth every morning with breakfast.        Fluticasone Propionate (Suspension) FLONASE 50 MCG/ACT Spray 2 Sprays in nose every day.        Lisinopril (Tab) PRINIVIL 5 MG Take 1 Tab by mouth  every day.        Multiple Vitamin (Tab) THERAGRAN  Take 1 Tab by mouth every day.        Spironolactone (Tab) ALDACTONE 25 MG Take 0.5 Tabs by mouth every day.        .                 Medicines prescribed today were sent to:     [id number] [place] - [address]    [address] [facility] [place] [address]    Phone: [phone] Fax: [phone]    Open 24 Hours?: Yes      Medication refill instructions:       If your prescription bottle indicates you have medication refills left, it is not necessary to call your provider’s office. Please contact your pharmacy and they will refill your medication.    If your prescription bottle indicates you do not have any refills left, you may request refills at any time through one of the following ways: The online Global Filmdemic system (except Urgent Care), by calling your provider’s office, or by asking your pharmacy to contact your provider’s office with a refill request. Medication refills are processed only during regular business hours and may not be available until the next business day. Your provider may request additional information or to have a follow-up visit with you prior to refilling your medication.   *Please Note: Medication refills are assigned a new Rx number when refilled electronically. Your pharmacy may indicate that no refills were authorized even though a new prescription for the same medication is available at the pharmacy. Please request the medicine by name with the pharmacy before contacting your provider for a refill.           Global Filmdemic Access Code: [email or web address]  Expires: 4/23/2017  9:54 AM    Global Filmdemic  A secure, online tool to manage your health information     [employer]’s Global Filmdemic® is a secure, online tool that connects you to your personalized health information from the privacy of your home -- day or night - making it very easy for you to manage your healthcare. Once the activation process is  completed, you can even access your medical information using the PlayGiga sergey, which is available for free in the Apple Sergey store or Google Play store.     PlayGiga provides the following levels of access (as shown below):   My Chart Features   [facility] Doctor [facility]  Specialists [facility]  Urgent  Care Non-[facility]  Primary Care  Doctor   Email your healthcare team securely and privately 24/7 X X X    Manage appointments: schedule your next appointment; view details of past/upcoming appointments X      Request prescription refills. X      View recent personal medical records, including lab and immunizations X X X X   View health record, including health history, allergies, medications X X X X   Read reports about your outpatient visits, procedures, consult and ER notes X X X X   See your discharge summary, which is a recap of your hospital and/or ER visit that includes your diagnosis, lab results, and care plan. X X       How to register for PlayGiga:  1. Go to  [email or web address].  2. Click on the Sign Up Now box, which takes you to the New Member Sign Up page. You will need to provide the following information:  a. Enter your PlayGiga Access Code exactly as it appears at the top of this page. (You will not need to use this code after you’ve completed the sign-up process. If you do not sign up before the expiration date, you must request a new code.)   b. Enter your date of birth.   c. Enter your home email address.   d. Click Submit, and follow the next screen’s instructions.  3. Create a PlayGiga ID. This will be your PlayGiga login ID and cannot be changed, so think of one that is secure and easy to remember.  4. Create a PlayGiga password. You can change your password at any time.  5. Enter your Password Reset Question and Answer. This can be used at a later time if you forget your password.   6. Enter your e-mail address. This allows you to receive e-mail notifications when new information is  available in [employer].  7. Click Sign Up. You can now view your health information.    For assistance activating your [employer] account, call [phone]

## 2017-04-24 ENCOUNTER — TELEPHONE (OUTPATIENT)
Dept: MEDICAL GROUP | Facility: MEDICAL CENTER | Age: 67
End: 2017-04-24

## 2017-04-24 NOTE — TELEPHONE ENCOUNTER
Mailed NP packet for patient to call back regarding pre-visit planning. Please transfer call to [phone].  Patient does not have a phone number listed.  Emergency contact is a business phone.

## 2017-04-24 NOTE — Clinical Note
April 24, 2017        Seth Olivera  [address]   [place] [address]        Dear Seth:    Josiane Olivera, we look forward to seeing you at your appointment at [address].  Included in this letter is your new patient paperwork.  If you prefer to arrive 15 minutes prior to your scheduled appointment time, please complete the attached paperwork in advance.  If not, we request that you arrive 30 minutes prior to your appointment to allow enough time to complete the paperwork in the office.  Please bring with you, your medication bottles including over the counter. Also bring a list of all providers you see including your previous primary care provider.    We still need to discuss this appointment with you, please call our office at [phone]    Your appointment is scheduled for:  5/1/17 AT 920AM    Scheduled with:  DIANA HURLEY    Patients arriving late to their appointment will, unfortunately, need to be rescheduled.    If you have scheduling questions or need to make changes to your appointment, please call [phone].     Best Regards,      [facility]

## 2017-05-01 ENCOUNTER — APPOINTMENT (OUTPATIENT)
Dept: MEDICAL GROUP | Facility: MEDICAL CENTER | Age: 67
End: 2017-05-01
Payer: MEDICARE

## 2017-05-01 ENCOUNTER — TELEPHONE (OUTPATIENT)
Dept: MEDICAL GROUP | Facility: MEDICAL CENTER | Age: 67
End: 2017-05-01

## 2017-05-11 ENCOUNTER — OFFICE VISIT (OUTPATIENT)
Dept: CARDIOLOGY | Facility: MEDICAL CENTER | Age: 67
End: 2017-05-11
Payer: MEDICARE

## 2017-05-11 VITALS
BODY MASS INDEX: 21.56 KG/M2 | HEIGHT: 74 IN | WEIGHT: 168 LBS | DIASTOLIC BLOOD PRESSURE: 76 MMHG | SYSTOLIC BLOOD PRESSURE: 108 MMHG | HEART RATE: 80 BPM | OXYGEN SATURATION: 95 %

## 2017-05-11 DIAGNOSIS — I50.20 NYHA CLASS 1 HEART FAILURE WITH REDUCED EJECTION FRACTION (HCC): ICD-10-CM

## 2017-05-11 DIAGNOSIS — F10.20 UNCOMPLICATED ALCOHOL DEPENDENCE (HCC): ICD-10-CM

## 2017-05-11 DIAGNOSIS — I50.20 ACC/AHA STAGE C SYSTOLIC HEART FAILURE (HCC): ICD-10-CM

## 2017-05-11 PROBLEM — I50.9 CHF, STAGE C (HCC): Status: RESOLVED | Noted: 2017-03-30 | Resolved: 2017-05-11

## 2017-05-11 PROCEDURE — G8420 CALC BMI NORM PARAMETERS: HCPCS | Performed by: NURSE PRACTITIONER

## 2017-05-11 PROCEDURE — 3017F COLORECTAL CA SCREEN DOC REV: CPT | Performed by: NURSE PRACTITIONER

## 2017-05-11 PROCEDURE — G8432 DEP SCR NOT DOC, RNG: HCPCS | Performed by: NURSE PRACTITIONER

## 2017-05-11 PROCEDURE — 99214 OFFICE O/P EST MOD 30 MIN: CPT | Performed by: NURSE PRACTITIONER

## 2017-05-11 PROCEDURE — 1101F PT FALLS ASSESS-DOCD LE1/YR: CPT | Mod: 8P | Performed by: NURSE PRACTITIONER

## 2017-05-11 PROCEDURE — 1036F TOBACCO NON-USER: CPT | Performed by: NURSE PRACTITIONER

## 2017-05-11 PROCEDURE — 4040F PNEUMOC VAC/ADMIN/RCVD: CPT | Performed by: NURSE PRACTITIONER

## 2017-05-11 RX ORDER — CHLORAL HYDRATE 500 MG
1000 CAPSULE ORAL
COMMUNITY

## 2017-05-11 ASSESSMENT — ENCOUNTER SYMPTOMS
PALPITATIONS: 0
ORTHOPNEA: 0
FEVER: 0
PND: 0
SHORTNESS OF BREATH: 0
COUGH: 0
MYALGIAS: 0
ABDOMINAL PAIN: 0
DIZZINESS: 1
CLAUDICATION: 0

## 2017-05-11 NOTE — MR AVS SNAPSHOT
"Seth Olivera   2017 7:40 AM   Office Visit   MRN: [id number]    Department:  [facility]   Dept Phone:  [phone]    Description:  Male : 1950   Provider:  JENNY Arnold           Allergies as of 2017     No Known Allergies      You were diagnosed with     ACC/AHA stage C systolic heart failure (CMS-[facility])   [3042333]       NYHA class 1 heart failure with reduced ejection fraction (CMS-[facility])   [3646519]         Vital Signs     Blood Pressure Pulse Height Weight Body Mass Index Oxygen Saturation    108/76 mmHg 80 1.88 m (6' 2.02\") 76.204 kg (168 lb) 21.56 kg/m2 95%    Smoking Status                   Former Smoker           Basic Information     Date Of Birth Sex Race Ethnicity Preferred Language    1950 Male Black or  Non- English      Your appointments     2017  8:00 AM   New Patient with JUAN Zambrano   [facility] [address]    [address]  [facility] [address]  [facility] [address]   [phone]           Please bring Photo ID, Insurance Cards, All Medication Bottles and copies of any legal documents (such as Living Will, Power of ) If speaking a language besides English please bring an adult . Please arrive 30 minutes prior for check in and registration. You will be receiving a confirmation call a few days before your appointment from our automated call confirmation system.              Problem List              ICD-10-CM Priority Class Noted - Resolved    Palpitations R00.2   3/20/2017 - Present    DELAROSA (dyspnea on exertion) R06.09 High  3/20/2017 - Present    Near syncope R55 High  3/20/2017 - Present    Abdominal distension (gaseous) R14.0   3/20/2017 - Present    JUAN CARLOS (acute kidney injury) (CMS-HCC) N17.9 High  3/20/2017 - Present    Cardiomyopathy (CMS-HCC) I42.9 High  3/21/2017 - Present    Iron deficiency anemia D50.9 Medium  3/21/2017 - Present    Alcohol dependence (CMS-HCC) F10.20 Low  " 3/21/2017 - Present    CHF, stage C (CMS-HCC) I50.9   3/30/2017 - Present    NYHA class 1 heart failure with reduced ejection fraction (CMS-HCC) I50.9   3/30/2017 - Present    ACC/AHA stage C systolic heart failure (CMS-HCC) I50.20   4/18/2017 - Present      Health Maintenance        Date Due Completion Dates    IMM DTaP/Tdap/Td Vaccine (1 - Tdap) 8/4/1970 ---    COLONOSCOPY 8/4/2001 ---    IMM ZOSTER VACCINE 8/4/2011 ---    IMM PNEUMOCOCCAL 65+ (ADULT) LOW/MEDIUM RISK SERIES (2 of 2 - PPSV23) 3/22/2018 3/22/2017            Current Immunizations     13-VALENT PCV PREVNAR 3/22/2017  5:57 AM    Influenza TIV (IM) 10/23/2008    Influenza Vaccine Adult HD 3/22/2017  5:56 AM    Influenza Vaccine Quad Inj (Preserved) 10/14/2015, 10/23/2014      Below and/or attached are the medications your provider expects you to take. Review all of your home medications and newly ordered medications with your provider and/or pharmacist. Follow medication instructions as directed by your provider and/or pharmacist. Please keep your medication list with you and share with your provider. Update the information when medications are discontinued, doses are changed, or new medications (including over-the-counter products) are added; and carry medication information at all times in the event of emergency situations     Allergies:  No Known Allergies          Medications  Valid as of: May 11, 2017 -  8:24 AM    Generic Name Brand Name Tablet Size Instructions for use    Aspirin (Chew Tab) ASA 81 MG Take 1 Tab by mouth every day.        Atorvastatin Calcium (Tab) LIPITOR 40 MG Take 1 Tab by mouth every bedtime.        Carvedilol (Tab) COREG 12.5 MG Take 1 Tab by mouth 2 times a day, with meals.        Cholecalciferol   Take 3 Caps by mouth every 7 days.        Ferrous Gluconate (Tab) FERGON 324 (38 FE) MG Take 1 Tab by mouth every morning with breakfast.        Fluticasone Propionate (Suspension) FLONASE 50 MCG/ACT Spray 2 Sprays in nose every  day.        Lisinopril (Tab) PRINIVIL 5 MG Take 1 Tab by mouth every day.        Multiple Vitamin (Tab) THERAGRAN  Take 1 Tab by mouth every day.        Omega-3 Fatty Acids (Cap) fish oil 1000 MG Take 1,000 mg by mouth 3 times a day, with meals.        Specialty Vitamins Products   Take  by mouth.        Spironolactone (Tab) ALDACTONE 25 MG Take 0.5 Tabs by mouth every day.        .                 Medicines prescribed today were sent to:     [facility] [address] [place] [address] - [address] [facility] [address]    [address] [facility] [place] [address]    Phone: [phone] Fax: [phone]    Open 24 Hours?: Yes      Medication refill instructions:       If your prescription bottle indicates you have medication refills left, it is not necessary to call your provider’s office. Please contact your pharmacy and they will refill your medication.    If your prescription bottle indicates you do not have any refills left, you may request refills at any time through one of the following ways: The online [employer] system (except Urgent Care), by calling your provider’s office, or by asking your pharmacy to contact your provider’s office with a refill request. Medication refills are processed only during regular business hours and may not be available until the next business day. Your provider may request additional information or to have a follow-up visit with you prior to refilling your medication.   *Please Note: Medication refills are assigned a new Rx number when refilled electronically. Your pharmacy may indicate that no refills were authorized even though a new prescription for the same medication is available at the pharmacy. Please request the medicine by name with the pharmacy before contacting your provider for a refill.        Your To Do List     Future Labs/Procedures Complete By Expires    ECHOCARDIOGRAM COMP W/O CONT  As directed 5/11/2018         [employer] Access Code: [email or web address]  Expires:  6/10/2017  8:24 AM    Thomas Golft  A secure, online tool to manage your health information     [employer]’s Advanced Photonix® is a secure, online tool that connects you to your personalized health information from the privacy of your home -- day or night - making it very easy for you to manage your healthcare. Once the activation process is completed, you can even access your medical information using the Advanced Photonix sergey, which is available for free in the Apple Sergey store or Google Play store.     Advanced Photonix provides the following levels of access (as shown below):   My Chart Features   [facility] Primary [facility] Doctor [facility]  Specialists [facility]  Urgent  Care Non-[facility]  Primary Care  Doctor   Email your healthcare team securely and privately 24/7 X X X    Manage appointments: schedule your next appointment; view details of past/upcoming appointments X      Request prescription refills. X      View recent personal medical records, including lab and immunizations X X X X   View health record, including health history, allergies, medications X X X X   Read reports about your outpatient visits, procedures, consult and ER notes X X X X   See your discharge summary, which is a recap of your hospital and/or ER visit that includes your diagnosis, lab results, and care plan. X X       How to register for Advanced Photonix:  1. Go to  [email or web address].  2. Click on the Sign Up Now box, which takes you to the New Member Sign Up page. You will need to provide the following information:  a. Enter your Advanced Photonix Access Code exactly as it appears at the top of this page. (You will not need to use this code after you’ve completed the sign-up process. If you do not sign up before the expiration date, you must request a new code.)   b. Enter your date of birth.   c. Enter your home email address.   d. Click Submit, and follow the next screen’s instructions.  3. Create a Advanced Photonix ID. This will be your Advanced Photonix login ID and cannot be changed, so think of one  that is secure and easy to remember.  4. Create a [employer] password. You can change your password at any time.  5. Enter your Password Reset Question and Answer. This can be used at a later time if you forget your password.   6. Enter your e-mail address. This allows you to receive e-mail notifications when new information is available in [employer].  7. Click Sign Up. You can now view your health information.    For assistance activating your [employer] account, call [phone]

## 2017-05-11 NOTE — PROGRESS NOTES
Subjective:   Seth Olivera is a 66 y.o. male who presents today for follow up for his heart failure.    Patient of Dr. Mcgarry, Pt was last seen 4/18/17. Pt has been taking his medications as directed-carvedilol, lisinopril and spironolactone. Pt states his fatigue is improving. He has noticed when he is having intercourse he is less fatigued afterward. Pt occasionally has some dizziness with standing and moving too quickly. Otherwise, he continues to deny chest pain, shortness of breath with rest, ADLs or exertion, palpitations, orthopnea, PND or Edema. He is able to walk up a flight of stairs and inclines without symptoms.    He has not been taking  His home weights.       Past Medical History   Diagnosis Date   • High cholesterol      Past Surgical History   Procedure Laterality Date   • Other orthopedic surgery       hardware in back     Family History   Problem Relation Age of Onset   • Lung Disease Father    • Other Father      heavy smoker   • No Known Problems Sister    • No Known Problems Brother      History   Smoking status   • Former Smoker   • Types: Cigarettes   Smokeless tobacco   • Never Used     No Known Allergies  Outpatient Encounter Prescriptions as of 5/11/2017   Medication Sig Dispense Refill   • Omega-3 Fatty Acids (FISH OIL) 1000 MG Cap capsule Take 1,000 mg by mouth 3 times a day, with meals.     • Specialty Vitamins Products (ONE-A-DAY CHOLESTEROL PLUS PO) Take  by mouth.     • atorvastatin (LIPITOR) 40 MG Tab Take 1 Tab by mouth every bedtime. 30 Tab 11   • carvedilol (COREG) 12.5 MG Tab Take 1 Tab by mouth 2 times a day, with meals. 60 Tab 11   • lisinopril (PRINIVIL) 5 MG Tab Take 1 Tab by mouth every day. 30 Tab 11   • spironolactone (ALDACTONE) 25 MG Tab Take 0.5 Tabs by mouth every day. 15 Tab 11   • aspirin (ASA) 81 MG Chew Tab chewable tablet Take 1 Tab by mouth every day. 100 Tab 1   • multivitamin (THERAGRAN) Tab Take 1 Tab by mouth every day. 30 Tab    • fluticasone (FLONASE) 50  "MCG/ACT nasal spray Spray 2 Sprays in nose every day. 16 g 1   • ferrous gluconate (FERGON) 324 (38 FE) MG Tab Take 1 Tab by mouth every morning with breakfast. 30 Tab 1   • Cholecalciferol (VITAMIN D PO) Take 3 Caps by mouth every 7 days.       No facility-administered encounter medications on file as of 5/11/2017.     Review of Systems   Constitutional: Positive for malaise/fatigue (improving). Negative for fever.   Respiratory: Negative for cough and shortness of breath.    Cardiovascular: Negative for chest pain, palpitations, orthopnea, claudication, leg swelling and PND.   Gastrointestinal: Negative for abdominal pain.   Musculoskeletal: Negative for myalgias.   Neurological: Positive for dizziness (on occasion).   All other systems reviewed and are negative.       Objective:   /76 mmHg  Pulse 80  Ht 1.88 m (6' 2.02\")  Wt 76.204 kg (168 lb)  BMI 21.56 kg/m2  SpO2 95%    Physical Exam   Constitutional: He is oriented to person, place, and time. He appears well-developed and well-nourished.   HENT:   Head: Normocephalic and atraumatic.   Eyes: EOM are normal.   Neck: Normal range of motion. Neck supple. No JVD present.   Cardiovascular: Normal rate, regular rhythm, normal heart sounds and intact distal pulses.    No murmur heard.  Pulmonary/Chest: Effort normal and breath sounds normal. No respiratory distress. He has no wheezes. He has no rales.   Abdominal: Soft. Bowel sounds are normal.   Musculoskeletal: Normal range of motion. He exhibits no edema.   Neurological: He is alert and oriented to person, place, and time.   Skin: Skin is warm and dry.   Psychiatric: He has a normal mood and affect.   Nursing note and vitals reviewed.    Lab Results   Component Value Date/Time    CHOLESTEROL, 03/21/2017 01:03 AM    * 03/21/2017 01:03 AM    HDL 42 03/21/2017 01:03 AM    TRIGLYCERIDES 99 03/21/2017 01:03 AM       Lab Results   Component Value Date/Time    SODIUM 140 03/30/2017 08:49 AM    " POTASSIUM 4.5 03/30/2017 08:49 AM    CHLORIDE 104 03/30/2017 08:49 AM    CO2 26 03/30/2017 08:49 AM    GLUCOSE 104* 03/30/2017 08:49 AM    BUN 18 03/30/2017 08:49 AM    CREATININE 1.54* 03/30/2017 08:49 AM     Lab Results   Component Value Date/Time    ALKALINE PHOSPHATASE 43 03/23/2017 02:46 AM    AST(SGOT) 14 03/23/2017 02:46 AM    ALT(SGPT) 7 03/23/2017 02:46 AM    TOTAL BILIRUBIN 0.2 03/23/2017 02:46 AM     Transthoracic Echo Report 3/20/17  No prior study is available for comparison.   Severely reduced left ventricular systolic function.  Left ventricular ejection fraction is visually estimated to be 30%.  Grade I diastolic dysfunction.  Normal right ventricular size.  Structurally normal aortic valve.  Mild mitral regurgitation.  Thickened mitral valve leaflets.  Trivial pericardial effusion.  Discussed results with patient's nurse and hospitalist via phone.      Myocardial Perfusion Report 3/21/17   NUCLEAR IMAGING INTERPRETATION   There are patchy defects along the medial aspect of the anterior and inferior      wall with some questionable patchy reversible defects. The defects do not    confine to normal vascular territory.     Given the very elevated heart rate (140s) during stress test, these defects    could be artifact from left bundle branch block. As the result, the study is    nondiagnostic for myocardial perfusion.       Akinetic septal wall.  Severely reduced ejection fraction of 36%. Left    ventricular size is at upper limit of normal.     ECG INTERPRETATION   Nondiagnostic due to baseline LBBB    Transthoracic Echo Report 3/23/17  Severely reduced left ventricular systolic function. Left ventricular   ejection fraction is visually estimated to be 20%. Global hypokinesis   with regional variation. 3 mL of contrast was administered. Contrast   was used to enhance visualization of the endocardial border.  Compared to the images of the prior study done -  there has been no   improvement in left  ventricular systolic function.      Assessment:     1. ACC/AHA stage C systolic heart failure (CMS-[facility])  ECHOCARDIOGRAM COMP W/O CONT   2. NYHA class 1 heart failure with reduced ejection fraction (CMS-[facility])  ECHOCARDIOGRAM COMP W/O CONT   3. Uncomplicated alcohol dependence (CMS-HCC)         Medical Decision Making:  Today's Assessment / Status / Plan:   1. HFrEF, EF 20%, Stage C, Class 1: Based on physical examination findings, patient is euvolemic. No JVD, lungs are clear to auscultation, no pitting edema in bilateral lower extremities, no ascites.  -Continue carvedilol 12.5 mg BID  -Continue lisinopril 5 mg daily  -Continue spironolactone 12.5 mg daily   -Repeat Echo in 6 weeks    2. Refrain from alcohol     -Reinforced s/sx of worsening heart failure with patient and weight monitoring. Pt verbalizes understanding. Pt to call office or RTC if present.     -Discussed with patient the importance of taking prescribed medications and their role in his heart failure management. Encouraged pt not to run out of his medications and to notify his pharmacy about 5 days before he runs out for a refill.    -Patient encouraged to bring in his medications or current list in with him at his next visit and all visits.    FU in clinic in 2 months with Dr. Mcgarry. Sooner if needed.    Patient verbalizes understanding and agrees with the plan of care.      Collaborating MD: Sergio Ascencio MD

## 2017-06-05 ENCOUNTER — TELEPHONE (OUTPATIENT)
Dept: MEDICAL GROUP | Facility: MEDICAL CENTER | Age: 67
End: 2017-06-05

## 2017-06-05 NOTE — TELEPHONE ENCOUNTER
Left message for patient to call back regarding NP pre-visit planning. Please transfer call to [phone].

## 2017-06-07 ENCOUNTER — TELEPHONE (OUTPATIENT)
Dept: MEDICAL GROUP | Facility: MEDICAL CENTER | Age: 67
End: 2017-06-07

## 2017-06-12 NOTE — PROGRESS NOTES
Heart Failure New Appointment       6MWT- 442 meters  MLWHF- 25      Education Narrative  Reviewed anatomy and physiology of heart failure with patient. Went over heart failure worksheet and patient's individual HF diagnosis, EF, risk factors, general medication classes and indications, as well as personal goals.  Goals: Patient's personal goal is maintain his current state of health and to eat a lower sodium diet.     Discussed daily weights, sodium restriction, worsening signs and symptoms to report to physician, heart medications, and importance of adherence to medication regimen. Patient verbalized understanding of the importance on monitoring his daily weights-he did not have a scale, so one was provided in clinic today. Also thoroughly reviewed his HF medications, he had many questions, but felt comfortable at the end of the session with the indications for the medications.    Reviewed dietary handouts, advance directive planning handout, and informed patient of HF new appointment follow-up phone call. 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 Reiterated it is more helpful to use natural herbs and spices than most salt substitutes.     Invited patient and family members/friends to HF support group and encouraged patient to call Heart Failure clinic during normal business hours with any questions.        Patient states full understanding of all information given.     Dasia HF RN  [phone]

## 2017-06-22 ENCOUNTER — HOSPITAL ENCOUNTER (OUTPATIENT)
Dept: CARDIOLOGY | Facility: MEDICAL CENTER | Age: 67
End: 2017-06-22
Attending: NURSE PRACTITIONER
Payer: MEDICARE

## 2017-06-22 DIAGNOSIS — I50.20 ACC/AHA STAGE C SYSTOLIC HEART FAILURE (HCC): ICD-10-CM

## 2017-06-22 DIAGNOSIS — I50.20 NYHA CLASS 1 HEART FAILURE WITH REDUCED EJECTION FRACTION (HCC): ICD-10-CM

## 2017-06-22 LAB
LV EJECT FRACT  99904: 30
LV EJECT FRACT MOD 2C 99903: 42.85
LV EJECT FRACT MOD 4C 99902: 25.79
LV EJECT FRACT MOD BP 99901: 35.06

## 2017-06-22 PROCEDURE — 93306 TTE W/DOPPLER COMPLETE: CPT

## 2017-06-22 PROCEDURE — 93306 TTE W/DOPPLER COMPLETE: CPT | Mod: 26 | Performed by: INTERNAL MEDICINE

## 2017-07-26 ENCOUNTER — OFFICE VISIT (OUTPATIENT)
Dept: CARDIOLOGY | Facility: MEDICAL CENTER | Age: 67
End: 2017-07-26
Payer: MEDICARE

## 2017-07-26 VITALS
WEIGHT: 164.6 LBS | BODY MASS INDEX: 21.12 KG/M2 | OXYGEN SATURATION: 96 % | DIASTOLIC BLOOD PRESSURE: 62 MMHG | HEART RATE: 72 BPM | HEIGHT: 74 IN | SYSTOLIC BLOOD PRESSURE: 110 MMHG

## 2017-07-26 DIAGNOSIS — I50.20 ACC/AHA STAGE C SYSTOLIC HEART FAILURE (HCC): ICD-10-CM

## 2017-07-26 DIAGNOSIS — I50.20 NYHA CLASS 1 HEART FAILURE WITH REDUCED EJECTION FRACTION (HCC): ICD-10-CM

## 2017-07-26 DIAGNOSIS — R06.09 DOE (DYSPNEA ON EXERTION): ICD-10-CM

## 2017-07-26 PROCEDURE — 99214 OFFICE O/P EST MOD 30 MIN: CPT | Mod: 25 | Performed by: INTERNAL MEDICINE

## 2017-07-26 PROCEDURE — 94620 PR PULMONARY STRESS TESTING,SIMPLE: CPT | Performed by: INTERNAL MEDICINE

## 2017-07-26 RX ORDER — SPIRONOLACTONE 25 MG/1
12.5 TABLET ORAL DAILY
Qty: 15 TAB | Refills: 11 | Status: SHIPPED | OUTPATIENT
Start: 2017-07-26

## 2017-07-26 RX ORDER — CARVEDILOL 25 MG/1
25 TABLET ORAL 2 TIMES DAILY WITH MEALS
Qty: 60 TAB | Refills: 11 | Status: SHIPPED | OUTPATIENT
Start: 2017-07-26

## 2017-07-26 RX ORDER — LISINOPRIL 10 MG/1
10 TABLET ORAL DAILY
Qty: 30 TAB | Refills: 11 | Status: SHIPPED | OUTPATIENT
Start: 2017-07-26 | End: 2017-08-31

## 2017-07-26 ASSESSMENT — MINNESOTA LIVING WITH HEART FAILURE QUESTIONNAIRE (MLHF)
DIFFICULTY SOCIALIZING WITH FAMILY OR FRIENDS: 0
COSTING YOU MONEY FOR MEDICAL CARE: 0
HAVING TO SIT OR LIE DOWN DURING THE DAY: 0
DIFFICULTY SLEEPING WELL AT NIGHT: 0
DIFFICULTY WITH SEXUAL ACTIVITIES: 2
MAKING YOU WORRY: 0
TIRED, FATIGUED OR LOW ON ENERGY: 2
WORKING AROUND THE HOUSE OR YARD DIFFICULT: 0
DIFFICULTY GOING AWAY FROM HOME: 0
LOSS OF SELF CONTROL IN YOUR LIFE: 0
DIFFICULTY TO CONCENTRATE OR REMEMBERING THINGS: 0
WALKING ABOUT OR CLIMBING STAIRS DIFFICULT: 0
MAKING YOU FEEL DEPRESSED: 0
DIFFICULTY WITH RECREATIONAL PASTIMES, SPORTS, HOBBIES: 0
GIVING YOU SIDE EFFECTS FROM TREATMENTS: 0
DIFFICULTY WORKING TO EARN A LIVING: 0
EATING LESS FOODS YOU LIKE: 0
TOTAL_SCORE: 6
MAKING YOU SHORT OF BREATH: 2
FEELING LIKE A BURDEN TO FAMILY AND FRIENDS: 0
SWELLING IN ANKLES OR LEGS: 0
MAKING YOU STAY IN A HOSPITAL: 0

## 2017-07-26 ASSESSMENT — 6 MINUTE WALK TEST (6MWT): TOTAL DISTANCE WALKED (METERS): 415

## 2017-07-26 ASSESSMENT — NEW YORK HEART ASSOCIATION (NYHA) CLASSIFICATION: NYHA FUNCTIONAL CLASS: CLASS L

## 2017-07-26 NOTE — PROGRESS NOTES
Subjective:   Seth Olivera is a 66 y.o. male who presents today for follow-up evaluation after recent admission for congestive heart failure. He was found to have a severely reduced ejection fraction. This was about 35% on nuclear scanning but lower on echocardiography. His nuclear scan was nondiagnostic for ischemia.    He's been doing well clinically. He is walking regularly without difficulty. He denies any dyspnea on exertion, PND, orthopnea or edema. He's noted no chest discomfort, palpitations or lightheadedness.      Past Medical History   Diagnosis Date   • High cholesterol      Past Surgical History   Procedure Laterality Date   • Other orthopedic surgery       hardware in back     Family History   Problem Relation Age of Onset   • Lung Disease Father    • Other Father      heavy smoker   • No Known Problems Sister    • No Known Problems Brother      History   Smoking status   • Former Smoker   • Types: Cigarettes   Smokeless tobacco   • Never Used     No Known Allergies  Outpatient Encounter Prescriptions as of 7/26/2017   Medication Sig Dispense Refill   • Omega-3 Fatty Acids (FISH OIL) 1000 MG Cap capsule Take 1,000 mg by mouth 3 times a day, with meals.     • Specialty Vitamins Products (ONE-A-DAY CHOLESTEROL PLUS PO) Take  by mouth.     • atorvastatin (LIPITOR) 40 MG Tab Take 1 Tab by mouth every bedtime. 30 Tab 11   • carvedilol (COREG) 12.5 MG Tab Take 1 Tab by mouth 2 times a day, with meals. 60 Tab 11   • lisinopril (PRINIVIL) 5 MG Tab Take 1 Tab by mouth every day. 30 Tab 11   • spironolactone (ALDACTONE) 25 MG Tab Take 0.5 Tabs by mouth every day. 15 Tab 11   • aspirin (ASA) 81 MG Chew Tab chewable tablet Take 1 Tab by mouth every day. 100 Tab 1   • multivitamin (THERAGRAN) Tab Take 1 Tab by mouth every day. 30 Tab    • fluticasone (FLONASE) 50 MCG/ACT nasal spray Spray 2 Sprays in nose every day. 16 g 1   • ferrous gluconate (FERGON) 324 (38 FE) MG Tab Take 1 Tab by mouth every morning with  "breakfast. 30 Tab 1   • Cholecalciferol (VITAMIN D PO) Take 3 Caps by mouth every 7 days.       No facility-administered encounter medications on file as of 7/26/2017.     ROS       Objective:   /62 mmHg  Pulse 72  Ht 1.88 m (6' 2\")  Wt 74.662 kg (164 lb 9.6 oz)  BMI 21.12 kg/m2  SpO2 96%    Physical Exam   Neck: No JVD present.   Cardiovascular: Normal rate and regular rhythm.  Exam reveals no gallop.    No murmur heard.  Pulmonary/Chest: Effort normal. He has no rales.   Abdominal: Soft. There is no tenderness.   Musculoskeletal: He exhibits no edema.     Lab Results   Component Value Date/Time    CHOLESTEROL, 03/21/2017 01:03 AM    * 03/21/2017 01:03 AM    HDL 42 03/21/2017 01:03 AM    TRIGLYCERIDES 99 03/21/2017 01:03 AM       Lab Results   Component Value Date/Time    SODIUM 140 03/30/2017 08:49 AM    POTASSIUM 4.5 03/30/2017 08:49 AM    CHLORIDE 104 03/30/2017 08:49 AM    CO2 26 03/30/2017 08:49 AM    GLUCOSE 104* 03/30/2017 08:49 AM    BUN 18 03/30/2017 08:49 AM    CREATININE 1.54* 03/30/2017 08:49 AM     Lab Results   Component Value Date/Time    ALKALINE PHOSPHATASE 43 03/23/2017 02:46 AM    AST(SGOT) 14 03/23/2017 02:46 AM    ALT(SGPT) 7 03/23/2017 02:46 AM    TOTAL BILIRUBIN 0.2 03/23/2017 02:46 AM      Lab Results   Component Value Date/Time    B NATRIURETIC PEPTIDE 245* 03/21/2017 12:00 PM      Echocardiogram:  CONCLUSIONS  Normal left ventricular chamber size. Mild left ventricular   hypertrophy. Sigmoid septum. Moderately reduced left ventricular   systolic function. Left ventricular ejection fraction is visually   estimated to be 30%. Global hypokinesis with regional variation. Grade   I diastolic dysfunction.  Normal left atrial size.  Compared to the report of the study done 3/23/2017- there has been some   improvement in LVEF.     SHARON PORTILLO  Exam Date:         06/22/2017      NUCLEAR IMAGING INTERPRETATION   There are patchy defects along the medial aspect of the " anterior and inferior      wall with some questionable patchy reversible defects. The defects do not    confine to normal vascular territory.     Given the very elevated heart rate (140s) during stress test, these defects    could be artifact from left bundle branch block. As the result, the study is    nondiagnostic for myocardial perfusion.       Akinetic septal wall.  Severely reduced ejection fraction of 36%. Left    ventricular size is at upper limit of normal.    Assessment:     1. ACC/AHA stage C systolic heart failure (CMS-HCC)     2. NYHA class 1 heart failure with reduced ejection fraction (CMS-HCC)     3. DELAROSA (dyspnea on exertion)         Medical Decision Making:  Today's Assessment / Status / Plan:     Mr. Olivera is clinically stable. He is asymptomatic from a cardiac standpoint. At this time, we will increase carvedilol to 25 mg twice a day. We will also increase lisinopril to 10 mg daily. He will have lab work in about 4 weeks and follow-up in about 6 weeks. Once we've maximized his medical therapy, we will obtain an echocardiogram to evaluate whether or not he needs to be considered for an AICD. Hopefully, his LV function will have improved.

## 2017-07-26 NOTE — MR AVS SNAPSHOT
"Seth Olivera   2017 7:30 AM   Office Visit   MRN: [id number]    Department:  [facility]   Dept Phone:  [phone]    Description:  Male : 1950   Provider:  Al Mcgarry M.D.           Reason for Visit     Follow-Up           Allergies as of 2017     No Known Allergies      You were diagnosed with     ACC/AHA stage C systolic heart failure (CMS-Formerly [facility])   [3014354]       NYHA class 1 heart failure with reduced ejection fraction (CMS-Formerly [facility])   [8467686]       DELAROSA (dyspnea on exertion)   [960900]         Vital Signs     Blood Pressure Pulse Height Weight Body Mass Index Oxygen Saturation    110/62 mmHg 72 1.88 m (6' 2\") 74.662 kg (164 lb 9.6 oz) 21.12 kg/m2 96%    Smoking Status                   Former Smoker           Basic Information     Date Of Birth Sex Race Ethnicity Preferred Language    1950 Male Black or  Non- English      Problem List              ICD-10-CM Priority Class Noted - Resolved    Palpitations R00.2   3/20/2017 - Present    DELAROSA (dyspnea on exertion) R06.09 High  3/20/2017 - Present    Near syncope R55 High  3/20/2017 - Present    Abdominal distension (gaseous) R14.0   3/20/2017 - Present    JUAN CARLOS (acute kidney injury) (CMS-HCC) N17.9 High  3/20/2017 - Present    Cardiomyopathy (CMS-HCC) I42.9 High  3/21/2017 - Present    Iron deficiency anemia D50.9 Medium  3/21/2017 - Present    Alcohol dependence (CMS-HCC) F10.20 Low  3/21/2017 - Present    NYHA class 1 heart failure with reduced ejection fraction (CMS-HCC) I50.9   3/30/2017 - Present    ACC/AHA stage C systolic heart failure (CMS-HCC) I50.20   2017 - Present      Health Maintenance        Date Due Completion Dates    IMM DTaP/Tdap/Td Vaccine (1 - Tdap) 1970 ---    COLONOSCOPY 2001 ---    IMM ZOSTER VACCINE 2011 ---    IMM INFLUENZA (1) 2017 3/22/2017, 10/14/2015, 10/23/2014, 10/23/2008    IMM PNEUMOCOCCAL 65+ (ADULT) LOW/MEDIUM RISK SERIES (2 of 2 - PPSV23) 3/22/2018 " 3/22/2017            Current Immunizations     13-VALENT PCV PREVNAR 3/22/2017  5:57 AM    Influenza TIV (IM) 10/23/2008    Influenza Vaccine Adult HD 3/22/2017  5:56 AM    Influenza Vaccine Quad Inj (Preserved) 10/14/2015, 10/23/2014      Below and/or attached are the medications your provider expects you to take. Review all of your home medications and newly ordered medications with your provider and/or pharmacist. Follow medication instructions as directed by your provider and/or pharmacist. Please keep your medication list with you and share with your provider. Update the information when medications are discontinued, doses are changed, or new medications (including over-the-counter products) are added; and carry medication information at all times in the event of emergency situations     Allergies:  No Known Allergies          Medications  Valid as of: July 26, 2017 -  8:05 AM    Generic Name Brand Name Tablet Size Instructions for use    Aspirin (Chew Tab) ASA 81 MG Take 1 Tab by mouth every day.        Atorvastatin Calcium (Tab) LIPITOR 40 MG Take 1 Tab by mouth every bedtime.        Carvedilol (Tab) COREG 25 MG Take 1 Tab by mouth 2 times a day, with meals.        Cholecalciferol   Take 3 Caps by mouth every 7 days.        Ferrous Gluconate (Tab) FERGON 324 (38 FE) MG Take 1 Tab by mouth every morning with breakfast.        Fluticasone Propionate (Suspension) FLONASE 50 MCG/ACT Spray 2 Sprays in nose every day.        Lisinopril (Tab) PRINIVIL 10 MG Take 1 Tab by mouth every day.        Multiple Vitamin (Tab) THERAGRAN  Take 1 Tab by mouth every day.        Omega-3 Fatty Acids (Cap) fish oil 1000 MG Take 1,000 mg by mouth 3 times a day, with meals.        Specialty Vitamins Products   Take  by mouth.        Spironolactone (Tab) ALDACTONE 25 MG Take 0.5 Tabs by mouth every day.        .                 Medicines prescribed today were sent to:     [id number] - [place], [place] - [address]  [facility]    [address] [address]    Phone: [phone] Fax: [phone]    Open 24 Hours?: Yes      Medication refill instructions:       If your prescription bottle indicates you have medication refills left, it is not necessary to call your provider’s office. Please contact your pharmacy and they will refill your medication.    If your prescription bottle indicates you do not have any refills left, you may request refills at any time through one of the following ways: The online [employer] system (except Urgent Care), by calling your provider’s office, or by asking your pharmacy to contact your provider’s office with a refill request. Medication refills are processed only during regular business hours and may not be available until the next business day. Your provider may request additional information or to have a follow-up visit with you prior to refilling your medication.   *Please Note: Medication refills are assigned a new Rx number when refilled electronically. Your pharmacy may indicate that no refills were authorized even though a new prescription for the same medication is available at the pharmacy. Please request the medicine by name with the pharmacy before contacting your provider for a refill.        Your To Do List     Future Labs/Procedures Complete By Expires    COMP METABOLIC PANEL  As directed 7/26/2018    LIPID PROFILE  As directed 7/26/2018         [employer] Access Code: [email or web address]  Expires: 8/25/2017  8:05 AM    [employer]  A secure, online tool to manage your health information     [employer]’s [employer]® is a secure, online tool that connects you to your personalized health information from the privacy of your home -- day or night - making it very easy for you to manage your healthcare. Once the activation process is completed, you can even access your medical information using the [employer] sergey, which is available for free in the Apple Serge[employer] store or Google Play  store.     [employer] provides the following levels of access (as shown below):   My Chart Features   [facility] Doctor [facility]  Specialists [facility]  Urgent  Care Non-[facility]  Primary Care  Doctor   Email your healthcare team securely and privately 24/7 X X X    Manage appointments: schedule your next appointment; view details of past/upcoming appointments X      Request prescription refills. X      View recent personal medical records, including lab and immunizations X X X X   View health record, including health history, allergies, medications X X X X   Read reports about your outpatient visits, procedures, consult and ER notes X X X X   See your discharge summary, which is a recap of your hospital and/or ER visit that includes your diagnosis, lab results, and care plan. X X       How to register for [employer]:  1. Go to  [email or web address].  2. Click on the Sign Up Now box, which takes you to the New Member Sign Up page. You will need to provide the following information:  a. Enter your [employer] Access Code exactly as it appears at the top of this page. (You will not need to use this code after you’ve completed the sign-up process. If you do not sign up before the expiration date, you must request a new code.)   b. Enter your date of birth.   c. Enter your home email address.   d. Click Submit, and follow the next screen’s instructions.  3. Create a [employer] ID. This will be your [employer] login ID and cannot be changed, so think of one that is secure and easy to remember.  4. Create a [employer] password. You can change your password at any time.  5. Enter your Password Reset Question and Answer. This can be used at a later time if you forget your password.   6. Enter your e-mail address. This allows you to receive e-mail notifications when new information is available in [employer].  7. Click Sign Up. You can now view your health information.    For assistance activating your [employer] account, call [phone]

## 2017-08-30 ENCOUNTER — HOSPITAL ENCOUNTER (OUTPATIENT)
Dept: LAB | Facility: MEDICAL CENTER | Age: 67
End: 2017-08-30
Attending: INTERNAL MEDICINE
Payer: MEDICARE

## 2017-08-30 DIAGNOSIS — I50.20 ACC/AHA STAGE C SYSTOLIC HEART FAILURE (HCC): ICD-10-CM

## 2017-08-30 LAB
ALBUMIN SERPL BCP-MCNC: 3.9 G/DL (ref 3.2–4.9)
ALBUMIN/GLOB SERPL: 1.1 G/DL
ALP SERPL-CCNC: 52 U/L (ref 30–99)
ALT SERPL-CCNC: 11 U/L (ref 2–50)
ANION GAP SERPL CALC-SCNC: 4 MMOL/L (ref 0–11.9)
AST SERPL-CCNC: 17 U/L (ref 12–45)
BILIRUB SERPL-MCNC: 0.5 MG/DL (ref 0.1–1.5)
BUN SERPL-MCNC: 13 MG/DL (ref 8–22)
CALCIUM SERPL-MCNC: 9.8 MG/DL (ref 8.5–10.5)
CHLORIDE SERPL-SCNC: 105 MMOL/L (ref 96–112)
CHOLEST SERPL-MCNC: 229 MG/DL (ref 100–199)
CO2 SERPL-SCNC: 30 MMOL/L (ref 20–33)
CREAT SERPL-MCNC: 1.38 MG/DL (ref 0.5–1.4)
FASTING STATUS PATIENT QL REPORTED: NORMAL
GFR SERPL CREATININE-BSD FRML MDRD: 52 ML/MIN/1.73 M 2
GLOBULIN SER CALC-MCNC: 3.5 G/DL (ref 1.9–3.5)
GLUCOSE SERPL-MCNC: 92 MG/DL (ref 65–99)
HDLC SERPL-MCNC: 46 MG/DL
LDLC SERPL CALC-MCNC: 151 MG/DL
POTASSIUM SERPL-SCNC: 5 MMOL/L (ref 3.6–5.5)
PROT SERPL-MCNC: 7.4 G/DL (ref 6–8.2)
SODIUM SERPL-SCNC: 139 MMOL/L (ref 135–145)
TRIGL SERPL-MCNC: 161 MG/DL (ref 0–149)

## 2017-08-30 PROCEDURE — 80061 LIPID PANEL: CPT

## 2017-08-30 PROCEDURE — 80053 COMPREHEN METABOLIC PANEL: CPT

## 2017-08-30 PROCEDURE — 36415 COLL VENOUS BLD VENIPUNCTURE: CPT

## 2017-08-31 ENCOUNTER — OFFICE VISIT (OUTPATIENT)
Dept: CARDIOLOGY | Facility: MEDICAL CENTER | Age: 67
End: 2017-08-31
Payer: MEDICARE

## 2017-08-31 VITALS
OXYGEN SATURATION: 94 % | HEART RATE: 73 BPM | DIASTOLIC BLOOD PRESSURE: 82 MMHG | HEIGHT: 74 IN | WEIGHT: 170 LBS | BODY MASS INDEX: 21.82 KG/M2 | SYSTOLIC BLOOD PRESSURE: 128 MMHG

## 2017-08-31 DIAGNOSIS — E78.5 DYSLIPIDEMIA: ICD-10-CM

## 2017-08-31 DIAGNOSIS — R06.09 DOE (DYSPNEA ON EXERTION): ICD-10-CM

## 2017-08-31 DIAGNOSIS — I50.20 NYHA CLASS 1 HEART FAILURE WITH REDUCED EJECTION FRACTION (HCC): ICD-10-CM

## 2017-08-31 DIAGNOSIS — I50.20 ACC/AHA STAGE C SYSTOLIC HEART FAILURE (HCC): ICD-10-CM

## 2017-08-31 PROCEDURE — 99214 OFFICE O/P EST MOD 30 MIN: CPT | Performed by: INTERNAL MEDICINE

## 2017-08-31 RX ORDER — ATORVASTATIN CALCIUM 40 MG/1
40 TABLET, FILM COATED ORAL DAILY
Qty: 30 TAB | Refills: 11 | Status: SHIPPED | OUTPATIENT
Start: 2017-08-31

## 2017-08-31 RX ORDER — LOSARTAN POTASSIUM 100 MG/1
100 TABLET ORAL DAILY
Qty: 30 TAB | Refills: 11 | Status: SHIPPED | OUTPATIENT
Start: 2017-08-31

## 2017-08-31 NOTE — PROGRESS NOTES
Subjective:   Seth Olivera is a 67 y.o. male who presents today for follow-up evaluation after recent admission for congestive heart failure. He was found to have a severely reduced ejection fraction. This was about 35% on nuclear scanning but lower on echocardiography. His nuclear scan was nondiagnostic for ischemia.    Over the last couple of months she's noted a cough. This is a dry cough and he does have some postnasal drip.    He's had no dyspnea on exertion, PND, orthopnea or edema. He denies any chest discomfort, palpitations or lightheadedness.      Past Medical History:   Diagnosis Date   • High cholesterol      Past Surgical History:   Procedure Laterality Date   • OTHER ORTHOPEDIC SURGERY      hardware in back     Family History   Problem Relation Age of Onset   • Lung Disease Father    • Other Father      heavy smoker   • No Known Problems Sister    • No Known Problems Brother      History   Smoking Status   • Former Smoker   • Types: Cigarettes   Smokeless Tobacco   • Never Used     No Known Allergies  Outpatient Encounter Prescriptions as of 8/31/2017   Medication Sig Dispense Refill   • carvedilol (COREG) 25 MG Tab Take 1 Tab by mouth 2 times a day, with meals. 60 Tab 11   • lisinopril (PRINIVIL) 10 MG Tab Take 1 Tab by mouth every day. 30 Tab 11   • spironolactone (ALDACTONE) 25 MG Tab Take 0.5 Tabs by mouth every day. 15 Tab 11   • Omega-3 Fatty Acids (FISH OIL) 1000 MG Cap capsule Take 1,000 mg by mouth 3 times a day, with meals.     • Specialty Vitamins Products (ONE-A-DAY CHOLESTEROL PLUS PO) Take  by mouth.     • atorvastatin (LIPITOR) 40 MG Tab Take 1 Tab by mouth every bedtime. 30 Tab 11   • aspirin (ASA) 81 MG Chew Tab chewable tablet Take 1 Tab by mouth every day. 100 Tab 1   • Cholecalciferol (VITAMIN D PO) Take 3 Caps by mouth every 7 days.     • multivitamin (THERAGRAN) Tab Take 1 Tab by mouth every day. 30 Tab    • fluticasone (FLONASE) 50 MCG/ACT nasal spray Spray 2 Sprays in nose every  "day. 16 g 1   • ferrous gluconate (FERGON) 324 (38 FE) MG Tab Take 1 Tab by mouth every morning with breakfast. 30 Tab 1     No facility-administered encounter medications on file as of 8/31/2017.      ROS       Objective:   /82   Pulse 73   Ht 1.88 m (6' 2\")   Wt 77.1 kg (170 lb)   SpO2 94%   BMI 21.83 kg/m²     Physical Exam   Neck: No JVD present.   Cardiovascular: Normal rate and regular rhythm.  Exam reveals no gallop.    No murmur heard.  Pulmonary/Chest: Effort normal. He has no rales.   Abdominal: Soft. There is no tenderness.   Musculoskeletal: He exhibits no edema.     Lab Results   Component Value Date/Time    CHOLSTRLTOT 229 (H) 08/30/2017 07:30 AM     (H) 08/30/2017 07:30 AM    HDL 46 08/30/2017 07:30 AM    TRIGLYCERIDE 161 (H) 08/30/2017 07:30 AM       Lab Results   Component Value Date/Time    SODIUM 139 08/30/2017 07:30 AM    POTASSIUM 5.0 08/30/2017 07:30 AM    CHLORIDE 105 08/30/2017 07:30 AM    CO2 30 08/30/2017 07:30 AM    GLUCOSE 92 08/30/2017 07:30 AM    BUN 13 08/30/2017 07:30 AM    CREATININE 1.38 08/30/2017 07:30 AM     Lab Results   Component Value Date/Time    ALKPHOSPHAT 52 08/30/2017 07:30 AM    ASTSGOT 17 08/30/2017 07:30 AM    ALTSGPT 11 08/30/2017 07:30 AM    TBILIRUBIN 0.5 08/30/2017 07:30 AM      Lab Results   Component Value Date/Time    BNPBTYPENAT 245 (H) 03/21/2017 12:00 PM      Echocardiogram:  CONCLUSIONS  Normal left ventricular chamber size. Mild left ventricular   hypertrophy. Sigmoid septum. Moderately reduced left ventricular   systolic function. Left ventricular ejection fraction is visually   estimated to be 30%. Global hypokinesis with regional variation. Grade   I diastolic dysfunction.  Normal left atrial size.  Compared to the report of the study done 3/23/2017- there has been some   improvement in LVEF.     SHARON PORTILLO  Exam Date:         06/22/2017      NUCLEAR IMAGING INTERPRETATION   There are patchy defects along the medial aspect of the " anterior and inferior      wall with some questionable patchy reversible defects. The defects do not    confine to normal vascular territory.     Given the very elevated heart rate (140s) during stress test, these defects    could be artifact from left bundle branch block. As the result, the study is    nondiagnostic for myocardial perfusion.       Akinetic septal wall.  Severely reduced ejection fraction of 36%. Left    ventricular size is at upper limit of normal.    Assessment:     1. NYHA class 1 heart failure with reduced ejection fraction (CMS-HCC)     2. ACC/AHA stage C systolic heart failure (CMS-HCC)     3. DELAROSA (dyspnea on exertion)         Medical Decision Making:  Today's Assessment / Status / Plan:     Mr. Olivera is clinically stable. He has developed a dry cough which could be secondary to lisinopril. At this time, we will discontinue lisinopril and start him on losartan. He is also advised to get an over-the-counter nasal steroid inhaler. He will follow-up in a couple of months with an echocardiogram prior.

## 2017-08-31 NOTE — PATIENT INSTRUCTIONS
Discontinue lisinopril    Start losartan 100 mg daily. If this causes lightheadedness, cut it in half.    Resume atorvastatin for high cholesterol    Lab work in 6 weeks and follow-up in 2 months with an echocardiogram prior.    Over-the-counter Flonase or Nasonex

## 2017-09-18 ENCOUNTER — HOSPITAL ENCOUNTER (OUTPATIENT)
Dept: CARDIOLOGY | Facility: MEDICAL CENTER | Age: 67
End: 2017-09-18
Attending: INTERNAL MEDICINE
Payer: MEDICARE

## 2017-09-18 DIAGNOSIS — I50.20 NYHA CLASS 1 HEART FAILURE WITH REDUCED EJECTION FRACTION (HCC): ICD-10-CM

## 2017-09-18 DIAGNOSIS — R06.09 DOE (DYSPNEA ON EXERTION): ICD-10-CM

## 2017-09-18 PROCEDURE — 93306 TTE W/DOPPLER COMPLETE: CPT

## 2017-09-18 PROCEDURE — 93306 TTE W/DOPPLER COMPLETE: CPT | Mod: 26 | Performed by: INTERNAL MEDICINE

## 2017-09-19 LAB
LV EJECT FRACT  99904: 50
LV EJECT FRACT MOD 4C 99902: 61.22

## 2021-09-28 NOTE — PROGRESS NOTES
"Reevaluation of 6MWT/MLWHF Questionnaire    Date: 3/30/2017  6MWT: 442 meters  MLWHF: 25    Date: 2017  6MWT: 415 meters  MLWHF: 6    OP Heart Failure  Vitals  Appointment Type: Heart Failure Established  Weight: 74.662 kg (164 lb 9.6 oz)  How Weight Obtained: Stand Up Scale  Height: 188 cm (6' 2\")  BMI (Calculated): 21.13  Blood Pressure : 110/62 mmHg  Pulse: 72    System Assessment  NYHA Functional Class Assessment: Class l  ACC/AHA HF Stage: C    Smoking Hx  Have you Ever Smoked: Yes  Have you Smoked in the Last 12 Mos: No  Confirm Quit Date:  (not on file)     MN Living with Heart Failure  Swelling in Ankles or Legs: 0  Having to Sit or Lie Down During the Day: 0  Walking About or Climbing Stairs Difficult: 0  Working Around the House or Yard Difficult: 0  Difficulty Going Away from Home: 0  Difficulty Sleeping Well at Night: 0  Difficulty Socializing with Family or Friends: 0  Difficulty Working to Earn a Livin  Difficulty with Recreational Pastimes, Sports, Hobbies: 0  Difficulty with Sexual Activities: 2  Eating Less Foods You Like: 0  Making you Short of Breath: 2  Tired, Fatigued or Low on Energy: 2  Making you Stay in a Hospital: 0  Costing you Money for Medical Care?: 0  Giving you Side Effects from Treatments: 0  Feeling like a [place] to Family and Friends: 0  Loss of Self Control in your Life: 0  Making You Worry: 0  Difficulty to Concentrate or Remembering Things: 0  Making you Feel Depressed: 0  MLHF Total Score : 6    6 Minute Walk Test  Baseline to end of test: 6:00  Total meters walked: 415       LEXY Forman RN  [phone]  " Bed:   Expected date:   Expected time:   Means of arrival:   Comments:  triage     Joseline Flores RN  09/28/21 [id number]

## 2024-11-22 NOTE — H&P
HOSPITAL MEDICINE HISTORY/ PHYSICAL    Date & Time note created:    3/20/2017   8:00 PM       Patient ID:   Name: Seth Olivera  . YOB: 1950. Age: 66 y.o. male. MRN: [id number]    Admitting Attending:  Shreyas Pereyra     PCP : Pcp Pt States None        Chief Complaint:       Palpitations and dyspnea on exertion    History of Present Illness:    Jarod is a 66 y.o. male w/h/o hyperlipidemia who presents with palpitations and dyspnea on exertion. Patient used to be on a walk miles. However once he turned 65 about 6 months ago, he started having dyspnea on exertion. He also had palpitations. It is worsened by walking and exertion and having sex. He describes having an irregular heartbeat when he exerts himself. He also went to the heart clinic where he was found to have left bundle branch block. Thus he was transferred over to return.    Review of Systems:    Has palpitations, shortness of breath, lightheadedness when jumping up  Please see HPI, all other systems were reviewed and are negative (AMA/CMS criteria)              Past Medical/ Family / Social history (PFSH):   Past Medical History   Diagnosis Date   • High cholesterol      Past Surgical History   Procedure Laterality Date   • Other orthopedic surgery       hardware in back     Current Outpatient Medications:  No current facility-administered medications on file prior to encounter.     No current outpatient prescriptions on file prior to encounter.     Medication Allergy/Sensitivities:  No Known Allergies  Family History:  History reviewed. No pertinent family history.   Mother had diabetes  Father was unknown to the patient  Social History:  Social History   Substance Use Topics   • Smoking status: Current Some Day Smoker     Types: Cigarettes   • Smokeless tobacco: Never Used   • Alcohol Use: Yes      Comment: heavy 3 days per month     #################################################################  Physical Exam:   Vitals/ General Appearance:  "  Weight/BMI: Body mass index is 22.07 kg/(m^2).  Blood pressure 142/98, pulse 90, temperature 36.8 °C (98.2 °F), resp. rate 20, height 1.88 m (6' 2\"), weight 78.019 kg (172 lb), SpO2 96 %.   Filed Vitals:    17 1630 17 1800 17 1830 17 1900   BP:       Pulse: 100 91 84 90   Temp:       Resp: 19 22  20   Height:       Weight:       SpO2: 96% 96% 95% 96%    Oxygen Therapy:  Pulse Oximetry: 96 %, O2 (LPM): 0, O2 Delivery: None (Room Air)    Constitutional:  well developed, well nourished  HENMT: Normocephalic, atraumatic, b/l ears normal, nose normal  Eyes:  EOMI, conjunctiva normal, no discharge  Neck: no tracheal deviation, supple  Cardiovascular: normal heart rate, normal rhythm, no murmurs, no rubs or gallops; no cyanosis, clubbing or edema  Lungs: Respiratory effort is normal, normal breath sounds, breath sounds clear to auscultation b/l, no rales, rhonchi or wheezing  Abdomen: soft, non-tender, no guarding or rebound  Skin: warm, dry, no erythema, no rash  Neurologic: Alert and oriented  Psychiatric: Some anxiety or depression    #################################################################  Lab Data Review:    Objective  Recent Results (from the past 24 hour(s))   EKG (ER)    Collection Time: 17  9:07 AM   Result Value Ref Range    Report       [facility]    Test Date:  2017  Pt Name:    SHARON PORTILLO                 Department: ER  MRN:        [id number]                      Room:        18  Gender:     M                            Technician: [id number]  :        1950                   Requested By:CHRISTY CHOPRA  Order #:    [id number]                    Reading MD: CHRISTY CHOPRA MD    Measurements  Intervals                                Axis  Rate:       85                           P:          29  FL:         152                          QRS:        26  QRSD:       140                          T:          229  QT:         " 400  QTc:        476    Interpretive Statements  SINUS RHYTHM  LEFT BUNDLE BRANCH BLOCK  BASELINE WANDER IN LEAD(S) I,II,aVR  No previous ECG available for comparison    Electronically Signed On 3- 11:28:55 PDT by CHRISTY CHOPRA MD     CBC WITH DIFFERENTIAL    Collection Time: 03/20/17 10:08 AM   Result Value Ref Range    WBC 4.6 (L) 4.8 - 10.8 K/uL    RBC 4.28 (L) 4.70 - 6.10 M/uL    Hemoglobin 11.5 (L) 14.0 - 18.0 g/dL    Hematocrit 36.3 (L) 42.0 - 52.0 %    MCV 84.8 81.4 - 97.8 fL    MCH 26.9 (L) 27.0 - 33.0 pg    MCHC 31.7 (L) 33.7 - 35.3 g/dL    RDW 53.1 (H) 35.9 - 50.0 fL    Platelet Count 216 164 - 446 K/uL    MPV 10.2 9.0 - 12.9 fL    Neutrophils-Polys 36.30 (L) 44.00 - 72.00 %    Lymphocytes 32.10 22.00 - 41.00 %    Monocytes 25.80 (H) 0.00 - 13.40 %    Eosinophils 4.40 0.00 - 6.90 %    Basophils 0.70 0.00 - 1.80 %    Immature Granulocytes 0.70 0.00 - 0.90 %    Nucleated RBC 0.00 /100 WBC    Neutrophils (Absolute) 1.67 (L) 1.82 - 7.42 K/uL    Lymphs (Absolute) 1.47 1.00 - 4.80 K/uL    Monos (Absolute) 1.18 (H) 0.00 - 0.85 K/uL    Eos (Absolute) 0.20 0.00 - 0.51 K/uL    Baso (Absolute) 0.03 0.00 - 0.12 K/uL    Immature Granulocytes (abs) 0.03 0.00 - 0.11 K/uL    NRBC (Absolute) 0.00 K/uL   COMP METABOLIC PANEL    Collection Time: 03/20/17 10:08 AM   Result Value Ref Range    Sodium 136 135 - 145 mmol/L    Potassium 4.1 3.6 - 5.5 mmol/L    Chloride 105 96 - 112 mmol/L    Co2 25 20 - 33 mmol/L    Anion Gap 6.0 0.0 - 11.9    Glucose 94 65 - 99 mg/dL    Bun 15 8 - 22 mg/dL    Creatinine 1.58 (H) 0.50 - 1.40 mg/dL    Calcium 9.2 8.5 - 10.5 mg/dL    AST(SGOT) 18 12 - 45 U/L    ALT(SGPT) 11 2 - 50 U/L    Alkaline Phosphatase 51 30 - 99 U/L    Total Bilirubin 0.4 0.1 - 1.5 mg/dL    Albumin 3.9 3.2 - 4.9 g/dL    Total Protein 6.9 6.0 - 8.2 g/dL    Globulin 3.0 1.9 - 3.5 g/dL    A-G Ratio 1.3 g/dL   LIPASE    Collection Time: 03/20/17 10:08 AM   Result Value Ref Range    Lipase 59 11 - 82 U/L   PROTHROMBIN  TIME    Collection Time: 03/20/17 10:08 AM   Result Value Ref Range    PT 13.0 12.0 - 14.6 sec    INR 0.95 0.87 - 1.13   APTT    Collection Time: 03/20/17 10:08 AM   Result Value Ref Range    APTT 26.2 24.7 - 36.0 sec   TROPONIN    Collection Time: 03/20/17 10:08 AM   Result Value Ref Range    Troponin I 0.03 0.00 - 0.04 ng/mL   BTYPE NATRIURETIC PEPTIDE    Collection Time: 03/20/17 10:08 AM   Result Value Ref Range    B Natriuretic Peptide 95 0 - 100 pg/mL   ESTIMATED GFR    Collection Time: 03/20/17 10:08 AM   Result Value Ref Range    GFR If  53 (A) >60 mL/min/1.73 m 2    GFR If Non  44 (A) >60 mL/min/1.73 m 2   TROPONIN    Collection Time: 03/20/17  5:48 PM   Result Value Ref Range    Troponin I 0.02 0.00 - 0.04 ng/mL       (click the triangle to expand results)  My interpretation of lab results:   Creatinine 1.58, 3 BC 4.6, hemoglobin 11.5  Imaging/Procedures Review:    DX-CHEST-PORTABLE (1 VIEW)   Final Result      No acute cardiopulmonary disease.      ECHOCARDIOGRAM COMP W/O CONT    (Results Pending)   NM-CARDIAC STRESS TEST    (Results Pending)     EKG:   per my independant read:  QTc:501, HR: 87, Normal Sinus Rhythm, left bundle branch block    Assessment and Plan:      1. Palpitations  - Consulted cardiology Mervat Price  - MPI stress test pending  - Lipid panel pending  - Trending troponins and monitoring on telemetry  - Cortisol/TSH/free T4 pending  2. Dyspnea on exertion  - Echo pending  3. Acute kidney injury  - Creatinine 1.58, above baseline  - Trial IV fluids  - Continue to monitor  4. Constipation  - MiraLAX and bowel protocol  5. Prophylaxis: sc heparin  6. Code: Full code per patient   7.          Alert and oriented to person, place and time